# Patient Record
Sex: FEMALE | Race: WHITE | NOT HISPANIC OR LATINO | Employment: OTHER | URBAN - METROPOLITAN AREA
[De-identification: names, ages, dates, MRNs, and addresses within clinical notes are randomized per-mention and may not be internally consistent; named-entity substitution may affect disease eponyms.]

---

## 2023-01-16 ENCOUNTER — OFFICE VISIT (OUTPATIENT)
Dept: GYNECOLOGIC ONCOLOGY | Facility: CLINIC | Age: 68
End: 2023-01-16

## 2023-01-16 VITALS
SYSTOLIC BLOOD PRESSURE: 120 MMHG | HEIGHT: 63 IN | WEIGHT: 139 LBS | BODY MASS INDEX: 24.63 KG/M2 | DIASTOLIC BLOOD PRESSURE: 64 MMHG | TEMPERATURE: 96.5 F

## 2023-01-16 DIAGNOSIS — C54.1 ENDOMETRIAL STROMAL SARCOMA (HCC): Primary | ICD-10-CM

## 2023-01-16 NOTE — ASSESSMENT & PLAN NOTE
Patient is a very pleasant 60-year-old female with a history of endometrial stromal sarcoma status post hysterectomy BSO staging followed by 6 cycles of Taxotere and gemcitabine  Patient has no evidence of recurrence of disease  Patient does have some dyspareunia  Exam is unremarkable without shortening of the vagina  This is likely related to surgery and lack of estrogen  We have recommended positional changes as well as lubrication  Patient will follow-up in 3 months for repeat exam and CAT scan

## 2023-01-16 NOTE — PROGRESS NOTES
Assessment/Plan:    Problem List Items Addressed This Visit        Genitourinary    Endometrial stromal sarcoma [facility]) - Primary     Patient is a very pleasant 69-year-old female with a history of endometrial stromal sarcoma status post hysterectomy BSO staging followed by 6 cycles of Taxotere and gemcitabine  Patient has no evidence of recurrence of disease  Patient does have some dyspareunia  Exam is unremarkable without shortening of the vagina  This is likely related to surgery and lack of estrogen  We have recommended positional changes as well as lubrication  Patient will follow-up in 3 months for repeat exam and CAT scan  Relevant Orders    CT chest abdomen pelvis w contrast    BUN    Creatinine, serum         CHIEF COMPLAINT: Surveillance endometrial stromal sarcoma      Problem:  Cancer Staging   Endometrial stromal sarcoma [facility])  Staging form: Corpus Uteri - Sarcoma, AJCC 8th Edition  - Clinical stage from 4/16/2021: FIGO Stage IB (cT1b, cN0, cM0) - Signed by Elian Chambers MD on 4/16/2021  - Pathologic: FIGO Stage IB (pT1b, pN0, cM0) - Signed by Elian Chambers MD on 4/16/2021        Previous therapy:  Oncology History   Personal history of adenocarcinoma of breast    Initial Diagnosis    Malignant neoplasm of right breast, stage 1, estrogen receptor positive ([facility] [address] )     11/7/2014 Surgery    Right mastectomy, SLNB  Left mastectomy, SLNB  Bilateral breast reconstruction with expanders  Dr Carias Client      12/2014 -  Hormone Therapy    Anastrazole     3/13/2015 Surgery    Bilateral implants placed  Dr Carias Client       Genetic Testing    BRCA negative     Endometrial stromal sarcoma ([facility] [address] )   3/26/2021 Initial Diagnosis    Endometrial stromal sarcoma ([address] )     3/26/2021 Surgery    Robot Hyst BSO with >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  Stage 1B   Literature review to consider Chem RT BMJ 2019     4/16/2021 -  Cancer Staged    Staging form: Corpus Uteri - Sarcoma, AJCC 8th Edition  - Clinical stage from 4/16/2021: FIGO Stage IB (cT1b, cN0, cM0) - Signed by Milly Nur MD on 4/16/2021  Stage prefix: Initial diagnosis  Histologic grade (G): G3  Histologic grading system: 3 grade system       4/16/2021 -  Cancer Staged    Staging form: Corpus Uteri - Sarcoma, AJCC 8th Edition  - Pathologic: FIGO Stage IB (pT1b, pN0, cM0) - Signed by Milly Nur MD on 4/16/2021  Histologic grade (G): G3  Histologic grading system: 3 grade system  Residual tumor (R): R0 - None       5/10/2021 - 8/31/2021 Chemotherapy    pegfilgrastim (NEULASTA), 4 mg (100 % of original dose 4 mg), Subcutaneous, Once, 2 of 2 cycles  Dose modification: 4 mg (original dose 4 mg, Cycle 5), 4 mg (original dose 4 mg, Cycle 6)  Administration: 4 mg (8/10/2021), 4 mg (8/31/2021)  pegfilgrastim (NEULASTA ONPRO), 6 mg, Subcutaneous, Once, 4 of 4 cycles  Administration: 6 mg (5/17/2021), 6 mg (6/8/2021), 6 mg (6/28/2021), 6 mg (7/19/2021)  DOCEtaxel (TAXOTERE) chemo infusion, 100 mg/m2 = 164 mg (133 3 % of original dose 75 mg/m2), Intravenous, Once, 6 of 6 cycles  Dose modification: 100 mg/m2 (original dose 75 mg/m2, Cycle 1, Reason: Other (Must fill in a comment))  Administration: 164 mg (5/17/2021), 160 mg (6/8/2021), 160 mg (6/28/2021), 165 mg (7/19/2021), 165 mg (8/9/2021), 169 mg (8/30/2021)  gemcitabine (GEMZAR) infusion, 900 mg/m2 = 1,493 9 mg (112 5 % of original dose 800 mg/m2), Intravenous, Once, 6 of 6 cycles  Dose modification: 900 mg/m2 (original dose 800 mg/m2, Cycle 1, Reason: Other (Must fill in a comment))  Administration: 1,493 9 mg (5/10/2021), 1,476 mg (5/17/2021), 1,400 mg (6/2/2021), 1,476 mg (6/8/2021), 1,400 mg (6/21/2021), 1,400 mg (6/28/2021), 1,400 mg (7/12/2021), 1,485 2 mg (7/19/2021), 1,485 2 mg (8/3/2021), 1,485 2 mg (8/9/2021), 1,600 mg (8/23/2021), 1,520 9 mg (8/30/2021)           Patient ID: Gonsalo Ulloa is a 75 y o  female  Patient is a very pleasant 79-year-old female with a history of stage Ib endometrial stromal sarcoma  She underwent surgery followed by 6 cycles of docetaxel gemcitabine  She has been disease-free since initial diagnosis in 2021  Patient now presents for follow-up  She is without complaint  Follow-up CAT scan is unremarkable  IMPRESSION:     No definite evidence of metastatic disease in the chest, abdomen or pelvis  Trace amount of fluid in the pelvis/presacral region of unknown clinical significance, possibly physiologic  Recommend attention to this region on subsequent follow-up        Today, the patient is doing well  She denies significant abdominal pain, pelvic pain, nausea, vomiting, constipation, diarrhea, fevers, chills, or vaginal bleeding  Patient does note some discomfort with intercourse  The following portions of the patient's history were reviewed and updated as appropriate: current medications, past family history, past medical history, past social history, past surgical history and problem list     Review of Systems   Constitutional: Negative  HENT: Negative  Eyes: Negative  Respiratory: Negative  Cardiovascular: Negative  Gastrointestinal: Negative  Endocrine: Negative  Genitourinary: Negative  Musculoskeletal: Negative  Skin: Negative  Neurological: Negative  Hematological: Negative  Psychiatric/Behavioral: Negative  Current Outpatient Medications   Medication Sig Dispense Refill   • Calcium Carb-Cholecalciferol (OSCAL-D) 500 mg-200 units per tablet Take 1 tablet by mouth 2 (two) times a day with meals     • Cholecalciferol (Vitamin D3) 1 25 MG (48850 UT) TABS Take by mouth     • glucosamine-chondroitin 500-400 MG tablet Take 1 tablet by mouth in the morning       No current facility-administered medications for this visit             Objective:    Blood pressure 120/64, temperature (!) 96 5 °F (35 8 °C), temperature source Tympanic, height 5' 3" (1 6 m), weight 63 kg (139 lb), not currently breastfeeding  Body mass index is 24 62 kg/m²  Body surface area is 1 66 meters squared  Physical Exam  Constitutional:       Appearance: She is well-developed  HENT:      Head: Normocephalic and atraumatic  Neck:      Thyroid: No thyromegaly  Cardiovascular:      Rate and Rhythm: Normal rate and regular rhythm  Heart sounds: Normal heart sounds  Pulmonary:      Effort: Pulmonary effort is normal       Breath sounds: Normal breath sounds  Abdominal:      General: Bowel sounds are normal       Palpations: Abdomen is soft  Comments: Well healed laparoscopic incisions  Genitourinary:     Comments: -Normal external female genitalia, normal Bartholin's and [place]'s glands                  -Normal midline urethral meatus  No lesions notes                  -Bladder without fullness mass or tenderness                  -Vagina without lesion or discharge No significant cystocele or rectocele noted                  -Cervix surgically absent                  -Uterus surgically absent                  -Adnexae surgically absent                  - Anus without fissure of lesion    Musculoskeletal:         General: Normal range of motion  Cervical back: Normal range of motion and neck supple  Lymphadenopathy:      Cervical: No cervical adenopathy  Skin:     General: Skin is warm and dry  Neurological:      Mental Status: She is alert and oriented to person, place, and time     Psychiatric:         Behavior: Behavior normal          Lab Results   Component Value Date     3 9 03/08/2022     Lab Results   Component Value Date     11/01/2015    K 4 2 10/07/2022     10/07/2022    CO2 27 10/07/2022    ANIONGAP 5 11/01/2015    BUN 16 01/06/2023    CREATININE 0 75 01/06/2023    GLUCOSE 98 11/01/2015    GLUF 115 (H) 10/07/2022    CALCIUM 9 4 10/07/2022    CORRECTEDCA 9 2 09/16/2021    AST 12 10/07/2022    ALT 25 10/07/2022    ALKPHOS 92 10/07/2022    PROT 7 5 11/01/2015 BILITOT 1 15 (H) 11/01/2015    EGFR 82 01/06/2023     Lab Results   Component Value Date    WBC 7 29 09/16/2021    HGB 9 1 (L) 09/16/2021    HCT 30 1 (L) 09/16/2021     (H) 09/16/2021     (H) 09/16/2021     Lab Results   Component Value Date    NEUTROABS 4 81 09/16/2021        Trend:  Lab Results   Component Value Date     3 9 03/08/2022     3 6 12/14/2021     5 7 10/25/2021     26 8 09/09/2021     12 0 08/19/2021     6 8 08/02/2021     5 4 07/08/2021     6 9 06/17/2021     9 4 05/27/2021     12 6 05/05/2021

## 2023-04-20 ENCOUNTER — APPOINTMENT (OUTPATIENT)
Dept: LAB | Facility: CLINIC | Age: 68
End: 2023-04-20

## 2023-04-20 DIAGNOSIS — E21.3 HYPERPARATHYROIDISM (HCC): ICD-10-CM

## 2023-04-20 DIAGNOSIS — C54.1 ENDOMETRIAL STROMAL SARCOMA (HCC): ICD-10-CM

## 2023-04-20 DIAGNOSIS — M81.0 AGE-RELATED OSTEOPOROSIS WITHOUT CURRENT PATHOLOGICAL FRACTURE: ICD-10-CM

## 2023-04-20 DIAGNOSIS — E55.9 VITAMIN D DEFICIENCY: ICD-10-CM

## 2023-04-20 LAB
25(OH)D3 SERPL-MCNC: 21.1 NG/ML (ref 30–100)
ALBUMIN SERPL BCP-MCNC: 4 G/DL (ref 3.5–5)
ALP SERPL-CCNC: 87 U/L (ref 46–116)
ALT SERPL W P-5'-P-CCNC: 30 U/L (ref 12–78)
ANION GAP SERPL CALCULATED.3IONS-SCNC: 4 MMOL/L (ref 4–13)
AST SERPL W P-5'-P-CCNC: 17 U/L (ref 5–45)
BILIRUB SERPL-MCNC: 1.37 MG/DL (ref 0.2–1)
BUN SERPL-MCNC: 19 MG/DL (ref 5–25)
CALCIUM SERPL-MCNC: 10.4 MG/DL (ref 8.3–10.1)
CHLORIDE SERPL-SCNC: 107 MMOL/L (ref 96–108)
CO2 SERPL-SCNC: 26 MMOL/L (ref 21–32)
CREAT SERPL-MCNC: 0.78 MG/DL (ref 0.6–1.3)
GFR SERPL CREATININE-BSD FRML MDRD: 78 ML/MIN/1.73SQ M
GLUCOSE P FAST SERPL-MCNC: 122 MG/DL (ref 65–99)
PHOSPHATE SERPL-MCNC: 3.9 MG/DL (ref 2.3–4.1)
POTASSIUM SERPL-SCNC: 5.7 MMOL/L (ref 3.5–5.3)
PROT SERPL-MCNC: 7.3 G/DL (ref 6.4–8.4)
PTH-INTACT SERPL-MCNC: 73.1 PG/ML (ref 18.4–80.1)
SODIUM SERPL-SCNC: 137 MMOL/L (ref 135–147)

## 2023-04-24 ENCOUNTER — OFFICE VISIT (OUTPATIENT)
Dept: GYNECOLOGIC ONCOLOGY | Facility: CLINIC | Age: 68
End: 2023-04-24

## 2023-04-24 VITALS
HEART RATE: 68 BPM | WEIGHT: 134 LBS | DIASTOLIC BLOOD PRESSURE: 64 MMHG | SYSTOLIC BLOOD PRESSURE: 118 MMHG | OXYGEN SATURATION: 99 % | HEIGHT: 63 IN | BODY MASS INDEX: 23.74 KG/M2

## 2023-04-24 DIAGNOSIS — C54.1 ENDOMETRIAL STROMAL SARCOMA (HCC): ICD-10-CM

## 2023-04-24 DIAGNOSIS — C54.1 ENDOMETRIAL SARCOMA (HCC): Primary | ICD-10-CM

## 2023-04-24 NOTE — PROGRESS NOTES
Assessment/Plan:    Problem List Items Addressed This Visit        Genitourinary    Endometrial stromal sarcoma [facility])     Patient is a very pleasant 50-year-old female with a history of stage Ib endometrial stromal sarcoma now without evidence of recurrence of disease approximately 2 years out since treatment with surgery and chemotherapy  We will extend her surveillance visits every 6 months and a CAT scan will be performed at that time  Other Visit Diagnoses     Endometrial sarcoma ([facility] [address] )    -  Primary    Relevant Orders    CT chest abdomen pelvis w contrast    BUN    Creatinine, serum            CHIEF COMPLAINT: Surveillance endometrial stromal sarcoma stage Ib      Problem:  Cancer Staging   Endometrial stromal sarcoma [facility])  Staging form: Corpus Uteri - Sarcoma, AJCC 8th Edition  - Clinical stage from 4/16/2021: FIGO Stage IB (cT1b, cN0, cM0) - Signed by Delia Fu MD on 4/16/2021  - Pathologic: FIGO Stage IB (pT1b, pN0, cM0) - Signed by Delia Fu MD on 4/16/2021        Previous therapy:  Oncology History   Personal history of adenocarcinoma of breast    Initial Diagnosis    Malignant neoplasm of right breast, stage 1, estrogen receptor positive ([facility] [address] )     11/7/2014 Surgery    Right mastectomy, SLNB  Left mastectomy, SLNB  Bilateral breast reconstruction with expanders  Dr Irving Desai      12/2014 -  Hormone Therapy    Anastrazole     3/13/2015 Surgery    Bilateral implants placed  Dr Irving Desai       Genetic Testing    BRCA negative     Endometrial stromal sarcoma ([facility] [address] )   3/26/2021 Initial Diagnosis    Endometrial stromal sarcoma ([facility] [address] )     3/26/2021 Surgery    Robot Hyst BSO with >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  Stage 1B   Literature review to consider Chem RT BMJ 2019     4/16/2021 -  Cancer Staged    Staging form: Corpus Uteri - Sarcoma, AJCC 8th Edition  - Clinical stage from 4/16/2021: FIGO Stage IB (cT1b, cN0, cM0) - Signed by Delia Fu MD on 4/16/2021  Stage prefix: Initial diagnosis  Histologic grade (G): G3  Histologic grading system: 3 grade system       4/16/2021 -  Cancer Staged    Staging form: Corpus Uteri - Sarcoma, AJCC 8th Edition  - Pathologic: FIGO Stage IB (pT1b, pN0, cM0) - Signed by Harrison Gresham MD on 4/16/2021  Histologic grade (G): G3  Histologic grading system: 3 grade system  Residual tumor (R): R0 - None       5/10/2021 - 8/31/2021 Chemotherapy    pegfilgrastim (NEULASTA), 4 mg (100 % of original dose 4 mg), Subcutaneous, Once, 2 of 2 cycles  Dose modification: 4 mg (original dose 4 mg, Cycle 5), 4 mg (original dose 4 mg, Cycle 6)  Administration: 4 mg (8/10/2021), 4 mg (8/31/2021)  pegfilgrastim (NEULASTA ONPRO), 6 mg, Subcutaneous, Once, 4 of 4 cycles  Administration: 6 mg (5/17/2021), 6 mg (6/8/2021), 6 mg (6/28/2021), 6 mg (7/19/2021)  DOCEtaxel (TAXOTERE) chemo infusion, 100 mg/m2 = 164 mg (133 3 % of original dose 75 mg/m2), Intravenous, Once, 6 of 6 cycles  Dose modification: 100 mg/m2 (original dose 75 mg/m2, Cycle 1, Reason: Other (Must fill in a comment))  Administration: 164 mg (5/17/2021), 160 mg (6/8/2021), 160 mg (6/28/2021), 165 mg (7/19/2021), 165 mg (8/9/2021), 169 mg (8/30/2021)  gemcitabine (GEMZAR) infusion, 900 mg/m2 = 1,493 9 mg (112 5 % of original dose 800 mg/m2), Intravenous, Once, 6 of 6 cycles  Dose modification: 900 mg/m2 (original dose 800 mg/m2, Cycle 1, Reason: Other (Must fill in a comment))  Administration: 1,493 9 mg (5/10/2021), 1,476 mg (5/17/2021), 1,400 mg (6/2/2021), 1,476 mg (6/8/2021), 1,400 mg (6/21/2021), 1,400 mg (6/28/2021), 1,400 mg (7/12/2021), 1,485 2 mg (7/19/2021), 1,485 2 mg (8/3/2021), 1,485 2 mg (8/9/2021), 1,600 mg (8/23/2021), 1,520 9 mg (8/30/2021)           Patient ID: Ev Cannon is a 75 y o  female  Patient is a very pleasant 69-year-old female with a 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    She underwent hysterectomy staging followed by 6 cycles "of docetaxel gemcitabine  She has been without disease since that time  Is approximately 2 years since her original diagnosis  Since her last visit the patient has recently undergone a CT scan of the chest abdomen pelvis  It reveals the following:  IMPRESSION:     No evidence of recurrent or metastatic disease throughout the chest, abdomen or pelvis  Today, the patient is doing well  She denies significant abdominal pain, pelvic pain, nausea, vomiting, constipation, diarrhea, fevers, chills, or vaginal bleeding  The following portions of the patient's history were reviewed and updated as appropriate: allergies, current medications, past family history, past medical history, past surgical history and problem list     Review of Systems   Constitutional: Negative  HENT: Negative  Eyes: Negative  Respiratory: Negative  Cardiovascular: Negative  Gastrointestinal: Negative  Endocrine: Negative  Genitourinary: Negative  Musculoskeletal: Negative  Skin: Negative  Neurological: Negative  Hematological: Negative  Psychiatric/Behavioral: Negative  Current Outpatient Medications   Medication Sig Dispense Refill   • Calcium Carb-Cholecalciferol (OSCAL-D) 500 mg-200 units per tablet Take 1 tablet by mouth 2 (two) times a day with meals     • Cholecalciferol (Vitamin D3) 1 25 MG (35724 UT) TABS Take by mouth     • glucosamine-chondroitin 500-400 MG tablet Take 1 tablet by mouth in the morning       No current facility-administered medications for this visit  Objective:    Blood pressure 118/64, pulse 68, height 5' 3\" (1 6 m), weight 60 8 kg (134 lb), SpO2 99 %, not currently breastfeeding  Body mass index is 23 74 kg/m²  Body surface area is 1 63 meters squared  Physical Exam  Constitutional:       Appearance: She is well-developed  HENT:      Head: Normocephalic and atraumatic  Neck:      Thyroid: No thyromegaly     Cardiovascular:      Rate and Rhythm: " Normal rate and regular rhythm  Heart sounds: Normal heart sounds  Pulmonary:      Effort: Pulmonary effort is normal       Breath sounds: Normal breath sounds  Abdominal:      General: Bowel sounds are normal       Palpations: Abdomen is soft  Comments: Well healed laparoscopic incisions  Genitourinary:     Comments: -Normal external female genitalia, normal Bartholin's and [place]'s glands                  -Normal midline urethral meatus  No lesions notes                  -Bladder without fullness mass or tenderness                  -Vagina without lesion or discharge No significant cystocele or rectocele noted                  -Cervix surgically absent                  -Uterus surgically absent                  -Adnexae surgically absent                  - Anus without fissure of lesion    Musculoskeletal:         General: Normal range of motion  Cervical back: Normal range of motion and neck supple  Lymphadenopathy:      Cervical: No cervical adenopathy  Skin:     General: Skin is warm and dry  Neurological:      Mental Status: She is alert and oriented to person, place, and time     Psychiatric:         Behavior: Behavior normal          Lab Results   Component Value Date     3 9 03/08/2022     Lab Results   Component Value Date     11/01/2015    K 5 7 (H) 04/20/2023     04/20/2023    CO2 26 04/20/2023    ANIONGAP 5 11/01/2015    BUN 19 04/20/2023    CREATININE 0 78 04/20/2023    GLUCOSE 98 11/01/2015    GLUF 122 (H) 04/20/2023    CALCIUM 10 4 (H) 04/20/2023    CORRECTEDCA 9 2 09/16/2021    AST 17 04/20/2023    ALT 30 04/20/2023    ALKPHOS 87 04/20/2023    PROT 7 5 11/01/2015    BILITOT 1 15 (H) 11/01/2015    EGFR 78 04/20/2023     Lab Results   Component Value Date    WBC 7 29 09/16/2021    HGB 9 1 (L) 09/16/2021    HCT 30 1 (L) 09/16/2021     (H) 09/16/2021     (H) 09/16/2021     Lab Results   Component Value Date    NEUTROABS 4 81 09/16/2021        Trend:  Lab Results   Component Value Date     3 9 03/08/2022     3 6 12/14/2021     5 7 10/25/2021     26 8 09/09/2021     12 0 08/19/2021     6 8 08/02/2021     5 4 07/08/2021     6 9 06/17/2021     9 4 05/27/2021     12 6 05/05/2021

## 2023-04-24 NOTE — ASSESSMENT & PLAN NOTE
Patient is a very pleasant 79-year-old female with a history of stage Ib endometrial stromal sarcoma now without evidence of recurrence of disease approximately 2 years out since treatment with surgery and chemotherapy  We will extend her surveillance visits every 6 months and a CAT scan will be performed at that time

## 2023-05-17 ENCOUNTER — OFFICE VISIT (OUTPATIENT)
Dept: SURGICAL ONCOLOGY | Facility: CLINIC | Age: 68
End: 2023-05-17

## 2023-05-17 VITALS
HEART RATE: 85 BPM | OXYGEN SATURATION: 98 % | SYSTOLIC BLOOD PRESSURE: 120 MMHG | WEIGHT: 134 LBS | DIASTOLIC BLOOD PRESSURE: 70 MMHG | HEIGHT: 63 IN | TEMPERATURE: 97.5 F | RESPIRATION RATE: 16 BRPM | BODY MASS INDEX: 23.74 KG/M2

## 2023-05-17 DIAGNOSIS — Z08 ENCOUNTER FOR FOLLOW-UP SURVEILLANCE OF BREAST CANCER: Primary | ICD-10-CM

## 2023-05-17 DIAGNOSIS — Z85.3 PERSONAL HISTORY OF ADENOCARCINOMA OF BREAST: ICD-10-CM

## 2023-05-17 DIAGNOSIS — Z85.3 ENCOUNTER FOR FOLLOW-UP SURVEILLANCE OF BREAST CANCER: Primary | ICD-10-CM

## 2023-05-17 DIAGNOSIS — N60.92 ATYPICAL DUCTAL HYPERPLASIA OF LEFT BREAST: ICD-10-CM

## 2023-05-17 NOTE — PROGRESS NOTES
Surgical Oncology Follow Up       [facility] SURGICAL ONCOLOGY [place]IE  [facility] [address]    Evangelist Frias  1955  [id number]  [facility] SURGICAL ONCOLOGY [facility] [address]    Chief Complaint   Patient presents with   • Follow-up       Assessment/Plan   Diagnoses and all orders for this visit:    Encounter for follow-up surveillance of breast cancer    Personal history of adenocarcinoma of breast    Atypical ductal hyperplasia of left breast        Advance Care Planning/Advance Directives:  Discussed disease status, cancer treatment plans and/or cancer treatment goals with the patient  Oncology History:    Oncology History   Personal history of adenocarcinoma of breast    Initial Diagnosis    Malignant neoplasm of right breast, stage 1, estrogen receptor positive ([facility] [address] )     11/7/2014 Surgery    Right mastectomy, SLNB  Left mastectomy, SLNB  Bilateral breast reconstruction with expanders  Dr Tricia Jones      12/2014 -  Hormone Therapy    Anastrazole     3/13/2015 Surgery    Bilateral implants placed  Dr Tricia Jones       Genetic Testing    BRCA negative     Endometrial stromal sarcoma ([facility] [address] )   3/26/2021 Initial Diagnosis    Endometrial stromal sarcoma ([facility] [address] )     3/26/2021 Surgery    Robot Hyst BSO with >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  Stage 1B   Literature review to consider Chem RT BMJ 2019     4/16/2021 -  Cancer Staged    Staging form: Corpus Uteri - Sarcoma, AJCC 8th Edition  - Clinical stage from 4/16/2021: FIGO Stage IB (cT1b, cN0, cM0) - Signed by Asad Berkowitz MD on 4/16/2021  Stage prefix: Initial diagnosis  Histologic grade (G): G3  Histologic grading system: 3 grade system       4/16/2021 -  Cancer Staged    Staging form: Corpus Uteri - Sarcoma, AJCC 8th Edition  - Pathologic: FIGO Stage IB (pT1b, pN0, cM0) - Signed by Asad Berkowitz MD on 4/16/2021  Histologic grade (G): G3  Histologic grading system: 3 grade system  Residual tumor (R): R0 - None       5/10/2021 - 8/31/2021 Chemotherapy    pegfilgrastim (NEULASTA), 4 mg (100 % of original dose 4 mg), Subcutaneous, Once, 2 of 2 cycles  Dose modification: 4 mg (original dose 4 mg, Cycle 5), 4 mg (original dose 4 mg, Cycle 6)  Administration: 4 mg (8/10/2021), 4 mg (8/31/2021)  pegfilgrastim (NEULASTA ONPRO), 6 mg, Subcutaneous, Once, 4 of 4 cycles  Administration: 6 mg (5/17/2021), 6 mg (6/8/2021), 6 mg (6/28/2021), 6 mg (7/19/2021)  DOCEtaxel (TAXOTERE) chemo infusion, 100 mg/m2 = 164 mg (133 3 % of original dose 75 mg/m2), Intravenous, Once, 6 of 6 cycles  Dose modification: 100 mg/m2 (original dose 75 mg/m2, Cycle 1, Reason: Other (Must fill in a comment))  Administration: 164 mg (5/17/2021), 160 mg (6/8/2021), 160 mg (6/28/2021), 165 mg (7/19/2021), 165 mg (8/9/2021), 169 mg (8/30/2021)  gemcitabine (GEMZAR) infusion, 900 mg/m2 = 1,493 9 mg (112 5 % of original dose 800 mg/m2), Intravenous, Once, 6 of 6 cycles  Dose modification: 900 mg/m2 (original dose 800 mg/m2, Cycle 1, Reason: Other (Must fill in a comment))  Administration: 1,493 9 mg (5/10/2021), 1,476 mg (5/17/2021), 1,400 mg (6/2/2021), 1,476 mg (6/8/2021), 1,400 mg (6/21/2021), 1,400 mg (6/28/2021), 1,400 mg (7/12/2021), 1,485 2 mg (7/19/2021), 1,485 2 mg (8/3/2021), 1,485 2 mg (8/9/2021), 1,600 mg (8/23/2021), 1,520 9 mg (8/30/2021)         History of Present Illness: Breast cancer follow-up, no breast referable concerns, is doing well from the GYN standpoint  -Interval History: None    Review of Systems:  Review of Systems   Constitutional: Negative  Negative for appetite change and fever  Eyes: Negative  Respiratory: Negative for shortness of breath  Cardiovascular: Negative  Gastrointestinal: Negative  Endocrine: Negative  Genitourinary: Negative  Musculoskeletal: Negative  Negative for arthralgias and myalgias  Skin: Negative  Allergic/Immunologic: Negative  Neurological: Negative  Hematological: Negative  Negative for adenopathy  Does not bruise/bleed easily  Psychiatric/Behavioral: Negative          Patient Active Problem List   Diagnosis   • Personal history of adenocarcinoma of breast   • Encounter for follow-up surveillance of breast cancer   • Uterine mass   • Drug-induced rash with eosinophilia and systemic symptoms   • History of mastectomy, right   • History of left mastectomy   • PONV (postoperative nausea and vomiting)   • History of robot-assisted laparoscopic hysterectomy   • Other eosinophilia   • Endometrial stromal sarcoma (HCC)   • Encounter for central line care   • Chemotherapy induced neutropenia (HCC)   • Age-related osteoporosis without current pathological fracture   • Vitamin D deficiency   • Hyperparathyroidism ([facility] [address] )     Past Medical History:   Diagnosis Date   • Anxiety    • Arthritis    • Colon polyp    • Costochondritis     left side/relieved withmuscle relaxer   • Depression    • Elevated WBC count     saw Dr Lizeth Davidson 3/18/21   • Exercise involving walking     and cycling in good weather   • Hypertension    • Limb alert care status     No BP/IV Right Arm   • Motion sickness    • Patient is Hinduism    • Pelvic mass    • PONV (postoperative nausea and vomiting)    • Post-menopausal    • Postmenopausal bleeding    • Prediabetes    • Refusal of blood transfusions as patient is Hinduism    • Use of aromatase inhibitors     finished arimidex   • Wears glasses      Past Surgical History:   Procedure Laterality Date   • BONE MARROW BIOPSY  03/23/2021    by Dr Lizeth Davidson   • BREAST BIOPSY Right 10/13/2014    NEEDLE CORE STEREOTACTIC X2    • BREAST RECONSTRUCTION  03/13/2015    WITH IMPLANT PROSTHESIS   • BREAST RECONSTRUCTION Bilateral 11/07/2014    WITH TISSUE EXPANDER    • BREAST SURGERY Bilateral    • COLONOSCOPY     • DILATION AND CURETTAGE OF UTERUS     • IR PORT PLACEMENT 5/6/2021   • JOINT REPLACEMENT Bilateral     knee   • KNEE ARTHROSCOPY Bilateral    • LIPOSUCTION W/ FAT INJECTION N/A 3/17/2017    Procedure: FAT GRAFTING ;  Surgeon: Isael Gongora MD;  Location: [facility];  Service:    • MASTECTOMY  11/07/2014 11/7/14 RIGHT-LEFT PROPHYLACTIC MASTECTOMY   • OOPHORECTOMY Right 04/1996    OVARY REMOVED BECAUSE OF TWISTED FALLOPIAN TUBE    • CO LAPS TOTAL HYSTERECT 250 GM/< W/RMVL TUBE/OVARY N/A 3/26/2021    Procedure: ROBOTIC TOTAL LAPAROSCOPIC HYSTERECTOMY, BILATERAL SALPINGO-OOPHORECTOMY;  Surgeon: Brando Escalante MD;  Location: [facility];  Service: Gynecology Oncology   • CO REVISION OF RECONSTRUCTED BREAST Bilateral 3/17/2017    Procedure: BREAST MOUND REVISION ;  Surgeon: Isael Gongora MD;  [facility]: [facility];  Service: Plastics   • CO REVISION OF RECONSTRUCTED BREAST Bilateral 3/11/2016    Procedure: RECONSTRUCTION BREAST MOUND WITH FAT GRAFTING BILATERAL BREASTS ;  Surgeon: Isael Gongora MD;  [facility];  Service: Plastics   • CO RMVL TYLER CTR VAD W/SUBQ PORT/ CTR/PRPH INSJ N/A 4/5/2022    Procedure: REMOVAL VENOUS PORT (PORT-A-CATH)IR;  Surgeon: Lee Chu DO;  [facility];  Service: Interventional Radiology   • SENTINEL LYMPH NODE BIOPSY Bilateral 11/07/2014   • TUBAL LIGATION       Family History   Problem Relation Age of Onset   • Breast cancer Mother         AGE DX UNK   • Prostate cancer Father         AGE DX UNK   • Breast cancer Maternal Aunt         AGE DX UNK   • Breast cancer Paternal Grandmother         AGE DX UNK   • Diabetes unspecified Maternal Aunt      Social History     Socioeconomic History   • Marital status: /Civil Union     Spouse name: Not on file   • Number of children: Not on file   • Years of education: Not on file   • Highest education level: Not on file   Occupational History   • Not on file   Tobacco Use   • Smoking status: Never   • Smokeless tobacco: Never   Vaping Use   • Vaping Use: Never used   Substance and Sexual Activity   • Alcohol use: Yes     Alcohol/week: 8 0 standard drinks     Types: 4 Glasses of wine, 4 Standard drinks or equivalent per week     Comment: on the weekends-Wine/Liquor   • Drug use: Never   • Sexual activity: Not Currently     Partners: Male     Birth control/protection: Diaphragm, Female Sterilization, Other     Comment: Pill   Other Topics Concern   • Not on file   Social History Narrative   • Not on file     Social Determinants of Health     Financial Resource Strain: Not on file   Food Insecurity: Not on file   Transportation Needs: Not on file   Physical Activity: Not on file   Stress: Not on file   Social Connections: Not on file   Intimate Partner Violence: Not on file   Housing Stability: Not on file       Current Outpatient Medications:   •  Calcium Carb-Cholecalciferol (OSCAL-D) 500 mg-200 units per tablet, Take 1 tablet by mouth 2 (two) times a day with meals, Disp: , Rfl:   •  Cholecalciferol (Vitamin D3) 1 25 MG (20531 UT) TABS, Take by mouth, Disp: , Rfl:   •  glucosamine-chondroitin 500-400 MG tablet, Take 1 tablet by mouth in the morning, Disp: , Rfl:   Allergies   Allergen Reactions   • Zithromax [Azithromycin] GI Intolerance       The following portions of the patient's history were reviewed and updated as appropriate: allergies, current medications, past family history, past medical history, past social history, past surgical history and problem list         Vitals:    05/17/23 0931   BP: 120/70   Pulse: 85   Resp: 16   Temp: 97 5 °F (36 4 °C)   SpO2: 98%       Physical Exam  Constitutional:       General: She is not in acute distress  Appearance: Normal appearance  She is well-developed  HENT:      Head: Normocephalic and atraumatic  Cardiovascular:      Heart sounds: Normal heart sounds  Pulmonary:      Breath sounds: Normal breath sounds  Chest:   Breasts:     Right: Skin change ( Mastectomy scar with implant) present  No mass or tenderness        Left: Skin change ( Mastectomy scar with implant) present  No mass or tenderness  Abdominal:      Palpations: Abdomen is soft  Lymphadenopathy:      Upper Body:      Right upper body: No supraclavicular, axillary or pectoral adenopathy  Left upper body: No supraclavicular, axillary or pectoral adenopathy  Neurological:      Mental Status: She is alert and oriented to person, place, and time  Psychiatric:         Mood and Affect: Mood normal              Discussion/Summary: 74-year-old female status post right mastectomy for a stage Ia invasive duct carcinoma  She had a left mastectomy as well showing atypical duct hyperplasia  She completed her anastrozole  She more recently was diagnosed with a high-grade endometrial stromal sarcoma  Her prior genetic testing was negative  There is no evidence of disease based on exam today  We will see her again in 1 year in our survivorship clinic or sooner should the need arise

## 2023-10-13 ENCOUNTER — APPOINTMENT (OUTPATIENT)
Dept: LAB | Facility: CLINIC | Age: 68
End: 2023-10-13
Payer: COMMERCIAL

## 2023-10-13 ENCOUNTER — TRANSCRIBE ORDERS (OUTPATIENT)
Dept: LAB | Facility: CLINIC | Age: 68
End: 2023-10-13

## 2023-10-13 DIAGNOSIS — M81.0 AGE-RELATED OSTEOPOROSIS WITHOUT CURRENT PATHOLOGICAL FRACTURE: ICD-10-CM

## 2023-10-13 DIAGNOSIS — R79.9 NONSPECIFIC FINDINGS ON EXAMINATION OF BLOOD: ICD-10-CM

## 2023-10-13 DIAGNOSIS — C54.1 ENDOMETRIAL SARCOMA (HCC): ICD-10-CM

## 2023-10-13 DIAGNOSIS — R79.9 NONSPECIFIC FINDINGS ON EXAMINATION OF BLOOD: Primary | ICD-10-CM

## 2023-10-13 LAB
ALBUMIN SERPL BCP-MCNC: 4.2 G/DL (ref 3.5–5)
ALP SERPL-CCNC: 66 U/L (ref 34–104)
ALT SERPL W P-5'-P-CCNC: 19 U/L (ref 7–52)
ANION GAP SERPL CALCULATED.3IONS-SCNC: 10 MMOL/L
AST SERPL W P-5'-P-CCNC: 25 U/L (ref 13–39)
BASOPHILS # BLD AUTO: 0.05 THOUSANDS/ÂΜL (ref 0–0.1)
BASOPHILS NFR BLD AUTO: 1 % (ref 0–1)
BILIRUB SERPL-MCNC: 1.3 MG/DL (ref 0.2–1)
BUN SERPL-MCNC: 15 MG/DL (ref 5–25)
CALCIUM SERPL-MCNC: 9.4 MG/DL (ref 8.4–10.2)
CHLORIDE SERPL-SCNC: 105 MMOL/L (ref 96–108)
CO2 SERPL-SCNC: 21 MMOL/L (ref 21–32)
CREAT SERPL-MCNC: 0.69 MG/DL (ref 0.6–1.3)
EOSINOPHIL # BLD AUTO: 0.14 THOUSAND/ÂΜL (ref 0–0.61)
EOSINOPHIL NFR BLD AUTO: 2 % (ref 0–6)
ERYTHROCYTE [DISTWIDTH] IN BLOOD BY AUTOMATED COUNT: 13 % (ref 11.6–15.1)
GFR SERPL CREATININE-BSD FRML MDRD: 89 ML/MIN/1.73SQ M
GLUCOSE P FAST SERPL-MCNC: 106 MG/DL (ref 65–99)
HCT VFR BLD AUTO: 48.9 % (ref 34.8–46.1)
HGB BLD-MCNC: 15.4 G/DL (ref 11.5–15.4)
IMM GRANULOCYTES # BLD AUTO: 0.01 THOUSAND/UL (ref 0–0.2)
IMM GRANULOCYTES NFR BLD AUTO: 0 % (ref 0–2)
LYMPHOCYTES # BLD AUTO: 1.92 THOUSANDS/ÂΜL (ref 0.6–4.47)
LYMPHOCYTES NFR BLD AUTO: 30 % (ref 14–44)
MCH RBC QN AUTO: 29.3 PG (ref 26.8–34.3)
MCHC RBC AUTO-ENTMCNC: 31.5 G/DL (ref 31.4–37.4)
MCV RBC AUTO: 93 FL (ref 82–98)
MONOCYTES # BLD AUTO: 0.55 THOUSAND/ÂΜL (ref 0.17–1.22)
MONOCYTES NFR BLD AUTO: 9 % (ref 4–12)
NEUTROPHILS # BLD AUTO: 3.65 THOUSANDS/ÂΜL (ref 1.85–7.62)
NEUTS SEG NFR BLD AUTO: 58 % (ref 43–75)
NRBC BLD AUTO-RTO: 0 /100 WBCS
PLATELET # BLD AUTO: 24 THOUSANDS/UL (ref 149–390)
PLATELET BLD QL SMEAR: ABNORMAL
PMV BLD AUTO: 13.2 FL (ref 8.9–12.7)
POIKILOCYTOSIS BLD QL SMEAR: PRESENT
POTASSIUM SERPL-SCNC: 5.2 MMOL/L (ref 3.5–5.3)
PROT SERPL-MCNC: 6.8 G/DL (ref 6.4–8.4)
RBC # BLD AUTO: 5.26 MILLION/UL (ref 3.81–5.12)
RBC MORPH BLD: PRESENT
SODIUM SERPL-SCNC: 136 MMOL/L (ref 135–147)
WBC # BLD AUTO: 6.32 THOUSAND/UL (ref 4.31–10.16)

## 2023-10-13 PROCEDURE — 80053 COMPREHEN METABOLIC PANEL: CPT

## 2023-10-13 PROCEDURE — 36415 COLL VENOUS BLD VENIPUNCTURE: CPT

## 2023-10-13 PROCEDURE — 85025 COMPLETE CBC W/AUTO DIFF WBC: CPT

## 2023-10-16 ENCOUNTER — TELEPHONE (OUTPATIENT)
Dept: SURGICAL ONCOLOGY | Facility: CLINIC | Age: 68
End: 2023-10-16

## 2023-10-16 ENCOUNTER — TELEPHONE (OUTPATIENT)
Dept: HEMATOLOGY ONCOLOGY | Facility: CLINIC | Age: 68
End: 2023-10-16

## 2023-10-16 DIAGNOSIS — C54.1 ENDOMETRIAL SARCOMA (HCC): Primary | ICD-10-CM

## 2023-10-16 NOTE — TELEPHONE ENCOUNTER
Appointment Schedule   Who are you speaking with? Patient   If it is not the patient, are they listed on an active communication consent form? N/A   Which provider is the appointment scheduled with? Dr. Lulu Tejada   At which location is the appointment scheduled for? [facility]   When is the appointment scheduled? Please list date and time 11/16/23 @ 840   What is the reason for this appointment? PCP wants her to come back due to her lab work    Did patient voice understanding of the details of this appointment? Yes   Was the no show policy reviewed with patient?  Yes

## 2023-10-16 NOTE — TELEPHONE ENCOUNTER
Patient Call    Who are you speaking with? Patient    If it is not the patient, are they listed on an active communication consent form? N/A   What is the reason for this call? Patient calling to confirm if she needs labs done for Dr. Kris Mike. While assisting her call was disconnected   Does this require a call back? yes   If a call back is required, please list best call back number [phone]   If a call back is required, advise that a message will be forwarded to their care team and someone will return their call as soon as possible. Did you relay this information to the patient?  yes

## 2023-10-17 ENCOUNTER — TELEPHONE (OUTPATIENT)
Dept: HEMATOLOGY ONCOLOGY | Facility: CLINIC | Age: 68
End: 2023-10-17

## 2023-10-17 DIAGNOSIS — D72.19 OTHER EOSINOPHILIA: Primary | ICD-10-CM

## 2023-10-17 NOTE — TELEPHONE ENCOUNTER
PCP requesting sooner appt to review recent lab work. Last seen by Dr. María Madsen on 4/14/21. I called patient, she denies any signs of bleeding or bruising. I have placed another order for CBC, she will have this done tomorrow. Appt scheduled for 10/19 with Dr. María Madsen in [facility] office. Patient is aware of appointment and appreciative.

## 2023-10-18 ENCOUNTER — TELEPHONE (OUTPATIENT)
Dept: HEMATOLOGY ONCOLOGY | Facility: CLINIC | Age: 68
End: 2023-10-18

## 2023-10-18 ENCOUNTER — APPOINTMENT (OUTPATIENT)
Dept: LAB | Facility: CLINIC | Age: 68
End: 2023-10-18
Payer: COMMERCIAL

## 2023-10-18 DIAGNOSIS — C54.1 ENDOMETRIAL SARCOMA (HCC): ICD-10-CM

## 2023-10-18 DIAGNOSIS — D72.19 OTHER EOSINOPHILIA: ICD-10-CM

## 2023-10-18 LAB
BASOPHILS # BLD AUTO: 0.06 THOUSANDS/ÂΜL (ref 0–0.1)
BASOPHILS NFR BLD AUTO: 1 % (ref 0–1)
CANCER AG125 SERPL-ACNC: 6.6 U/ML (ref 0–35)
EOSINOPHIL # BLD AUTO: 0.14 THOUSAND/ÂΜL (ref 0–0.61)
EOSINOPHIL NFR BLD AUTO: 2 % (ref 0–6)
ERYTHROCYTE [DISTWIDTH] IN BLOOD BY AUTOMATED COUNT: 12.8 % (ref 11.6–15.1)
HCT VFR BLD AUTO: 46.1 % (ref 34.8–46.1)
HGB BLD-MCNC: 15.5 G/DL (ref 11.5–15.4)
IMM GRANULOCYTES # BLD AUTO: 0.02 THOUSAND/UL (ref 0–0.2)
IMM GRANULOCYTES NFR BLD AUTO: 0 % (ref 0–2)
LYMPHOCYTES # BLD AUTO: 1.98 THOUSANDS/ÂΜL (ref 0.6–4.47)
LYMPHOCYTES NFR BLD AUTO: 25 % (ref 14–44)
MCH RBC QN AUTO: 30.8 PG (ref 26.8–34.3)
MCHC RBC AUTO-ENTMCNC: 33.6 G/DL (ref 31.4–37.4)
MCV RBC AUTO: 92 FL (ref 82–98)
MONOCYTES # BLD AUTO: 0.6 THOUSAND/ÂΜL (ref 0.17–1.22)
MONOCYTES NFR BLD AUTO: 8 % (ref 4–12)
NEUTROPHILS # BLD AUTO: 5.08 THOUSANDS/ÂΜL (ref 1.85–7.62)
NEUTS SEG NFR BLD AUTO: 64 % (ref 43–75)
NRBC BLD AUTO-RTO: 0 /100 WBCS
PLATELET # BLD AUTO: 200 THOUSANDS/UL (ref 149–390)
PMV BLD AUTO: 11.9 FL (ref 8.9–12.7)
RBC # BLD AUTO: 5.04 MILLION/UL (ref 3.81–5.12)
WBC # BLD AUTO: 7.88 THOUSAND/UL (ref 4.31–10.16)

## 2023-10-18 PROCEDURE — 85025 COMPLETE CBC W/AUTO DIFF WBC: CPT

## 2023-10-18 PROCEDURE — 36415 COLL VENOUS BLD VENIPUNCTURE: CPT

## 2023-10-18 PROCEDURE — 86304 IMMUNOASSAY TUMOR CA 125: CPT

## 2023-10-18 NOTE — TELEPHONE ENCOUNTER
Spoke to patient regarding her labs for Dr. Tip Varghese visit. Patient did go back for a re-draw and managed to complete labs for appointment.

## 2023-10-18 NOTE — TELEPHONE ENCOUNTER
Patient Call    Who are you speaking with? Patient    If it is not the patient, are they listed on an active communication consent form? Yes   What is the reason for this call? Patient went to lab to do as requested, could not draw enough blood, should she go back and try again for appmt tomorrow with Dr Jade Diaz? Does this require a call back? Yes   If a call back is required, please list [facility] call back number [phone]    If a call back is required, advise that a message will be forwarded to their care team and someone will return their call as soon as possible. Did you relay this information to the patient?  Yes

## 2023-10-19 ENCOUNTER — OFFICE VISIT (OUTPATIENT)
Dept: HEMATOLOGY ONCOLOGY | Facility: CLINIC | Age: 68
End: 2023-10-19
Payer: COMMERCIAL

## 2023-10-19 VITALS
WEIGHT: 151.8 LBS | HEIGHT: 63 IN | HEART RATE: 82 BPM | OXYGEN SATURATION: 98 % | BODY MASS INDEX: 26.9 KG/M2 | TEMPERATURE: 97.8 F | SYSTOLIC BLOOD PRESSURE: 118 MMHG | RESPIRATION RATE: 17 BRPM | DIASTOLIC BLOOD PRESSURE: 70 MMHG

## 2023-10-19 DIAGNOSIS — D58.2 ELEVATED HEMOGLOBIN (HCC): Primary | ICD-10-CM

## 2023-10-19 PROCEDURE — 99214 OFFICE O/P EST MOD 30 MIN: CPT | Performed by: INTERNAL MEDICINE

## 2023-10-19 NOTE — PROGRESS NOTES
Hematology Outpatient Follow - Up Note  Chanel Ferrara 70 y.o. female MRN: @ Encounter: [id number]        Date:  10/19/2023        Assessment/ Plan:    #1.  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 Previous history of eosinophilia bone marrow biopsy was negative  3. Previous history of breast cancer stage I status post right mastectomy and left prophylactic mastectomy treated with anastrozole for 5 years  4. Erroneous thrombocytopenia by lab now with normal lab showing normal platelets  5. Elevated hemoglobin we will check JAK2 mutation        Labs and imaging studies are reviewed by ordering provider once results are available. If there are findings that need immediate attention, you will be contacted when results available. Discussing results and the implication on your healthcare is best discussed in person at your follow-up visit. HPI:    79-year-old postmenopausal female who had abnormal mammogram of the right breast subsequently diagnosed with multifocal ductal carcinoma in situ, the patient underwent right mastectomy and left prophylactic mastectomy the left side was negative for malignancy, the right side showed invasive ductal carcinoma with tubular features and 2 foci of malignancy the 1st focus measuring 9 mm and the 2nd 1 measuring 1.7 mm grade 1, no evidence of lymphovascular invasion with background of carcinoma in situ of multifocal, extensive however negative margins, ERPR positive, HER2 negative stage I ( pT1b, pN0, grade 1) status post immediate expanders and later on implantation  She had positive family history of breast cancer in her cousin  She was initiated on anastrozole 1 mg p.o.  Daily since December 2014   DEXA scan showed osteopenia initially and later on osteoporosis, she declined treatment with Prolia     Uterus biopsy showed hyperplasia, anastrozole was stopped in January 2020     Intermittent abdominal pain especially in the left lower quadrant, CT scan showed enlarging uterus mass with iliac lymph node enlargement, followed by Gynecology, she received steroid shot in the left lower quadrant and later on she received Medrol Dosepak for pain relieve of the left lower quadrant with referred pain to the left shoulder     On 02/26/2021 WBC 01073, platelets 699120, hemoglobin 13, MCV 89, 39% neutrophils, 4% monocytes, 51% eosinophiles, 5% bands, 0% basophils with leukocytosis and eosinophilia, repeat CBC on 03/01/2021 showed WBC of 18425, platelets 255175, hemoglobin 13.1, 11% bands, 31% eosinophiles, 44% neutrophils     Flow cytometry on the peripheral blood showed no evidence of acute leukemia, lymphoma      Negative for bcr/ABL by PCR, PDGFR alpha, PDGFR beta    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 Labs:   Lab Results   Component Value Date    WBC 7.88 10/18/2023    HGB 15.5 (H) 10/18/2023    HCT 46.1 10/18/2023    MCV 92 10/18/2023     10/18/2023     Lab Results   Component Value Date     11/01/2015    K 5.2 10/13/2023     10/13/2023    CO2 21 10/13/2023    ANIONGAP 5 11/01/2015    BUN 15 10/13/2023    CREATININE 0.69 10/13/2023    GLUCOSE 98 11/01/2015    GLUF 106 (H) 10/13/2023    CALCIUM 9.4 10/13/2023    CORRECTEDCA 9.2 09/16/2021    AST 25 10/13/2023    ALT 19 10/13/2023    ALKPHOS 66 10/13/2023    PROT 7.5 11/01/2015    BILITOT 1.15 (H) 11/01/2015    EGFR 89 10/13/2023       No results found for: "IRON", "TIBC", "FERRITIN"    No results found for: "AVMRFTHY32"      ROS: Review of Systems   Constitutional:  Negative for appetite change, chills, diaphoresis, fatigue and unexpected weight change.    HENT:   Negative for mouth sores, nosebleeds, sore throat, trouble swallowing and voice change. Eyes:  Negative for eye problems and icterus. Respiratory:  Negative for chest tightness, cough, hemoptysis and wheezing. Cardiovascular:  Negative for chest pain, leg swelling and palpitations. Gastrointestinal:  Negative for abdominal distention, abdominal pain, blood in stool, constipation, diarrhea, nausea and vomiting. Endocrine: Negative for hot flashes. Genitourinary:  Negative for bladder incontinence, difficulty urinating, dyspareunia, dysuria and frequency. Musculoskeletal:  Negative for arthralgias, back pain, gait problem, neck pain and neck stiffness. Skin:  Negative for itching and rash. Neurological:  Negative for dizziness, gait problem, headaches, numbness, seizures and speech difficulty. Hematological:  Negative for adenopathy. Does not bruise/bleed easily. Psychiatric/Behavioral:  Negative for decreased concentration, depression, sleep disturbance and suicidal ideas. The patient is nervous/anxious. Current Medications: Reviewed  Allergies: Reviewed  PMH/FH/SH:  Reviewed      Physical Exam:    Body surface area is 1.72 meters squared. Wt Readings from Last 3 Encounters:   10/19/23 68.9 kg (151 lb 12.8 oz)   05/17/23 60.8 kg (134 lb)   04/24/23 60.8 kg (134 lb)        Temp Readings from Last 3 Encounters:   10/19/23 97.8 °F (36.6 °C) (Temporal)   05/17/23 97.5 °F (36.4 °C) (Temporal)   01/16/23 (!) 96.5 °F (35.8 °C) (Tympanic)        BP Readings from Last 3 Encounters:   10/19/23 118/70   05/17/23 120/70   04/24/23 118/64         Pulse Readings from Last 3 Encounters:   10/19/23 82   05/17/23 85   04/24/23 68        Physical Exam  Vitals reviewed. Constitutional:       General: She is not in acute distress. Appearance: She is well-developed. She is not diaphoretic. HENT:      Head: Normocephalic and atraumatic. Eyes:      Conjunctiva/sclera: Conjunctivae normal.   Neck:      Trachea: No tracheal deviation.    Cardiovascular: Rate and Rhythm: Normal rate and regular rhythm. Heart sounds: No murmur heard. No friction rub. No gallop. Pulmonary:      Effort: Pulmonary effort is normal. No respiratory distress. Breath sounds: Normal breath sounds. No wheezing or rales. Chest:      Chest wall: No tenderness. Abdominal:      General: There is no distension. Palpations: Abdomen is soft. Tenderness: There is no abdominal tenderness. Musculoskeletal:      Cervical back: Normal range of motion and neck supple. Lymphadenopathy:      Cervical: No cervical adenopathy. Skin:     General: Skin is warm and dry. Coloration: Skin is not pale. Findings: No erythema. Neurological:      Mental Status: She is alert and oriented to person, place, and time. Psychiatric:         Behavior: Behavior normal.         Thought Content: Thought content normal.         Judgment: Judgment normal.         ECO    Goals and Barriers:  Current Goal: Minimize effects of disease. Barriers: None. Patient's Capacity to Self Care:  Patient is able to self care.     Code Status: [unfilled]

## 2023-10-24 ENCOUNTER — HOSPITAL ENCOUNTER (OUTPATIENT)
Dept: CT IMAGING | Facility: HOSPITAL | Age: 68
Discharge: HOME | End: 2023-10-24
Attending: OBSTETRICS & GYNECOLOGY
Payer: COMMERCIAL

## 2023-10-24 DIAGNOSIS — C54.1 ENDOMETRIAL SARCOMA (HCC): ICD-10-CM

## 2023-10-24 PROCEDURE — 74177 CT ABD & PELVIS W/CONTRAST: CPT

## 2023-10-24 PROCEDURE — G1004 CDSM NDSC: HCPCS

## 2023-10-24 PROCEDURE — 71260 CT THORAX DX C+: CPT

## 2023-10-24 RX ADMIN — IOHEXOL 100 ML: 350 INJECTION, SOLUTION INTRAVENOUS at 13:53

## 2023-10-30 ENCOUNTER — OFFICE VISIT (OUTPATIENT)
Dept: GYNECOLOGIC ONCOLOGY | Facility: CLINIC | Age: 68
End: 2023-10-30
Payer: COMMERCIAL

## 2023-10-30 VITALS
WEIGHT: 150.6 LBS | RESPIRATION RATE: 16 BRPM | HEART RATE: 75 BPM | DIASTOLIC BLOOD PRESSURE: 82 MMHG | HEIGHT: 63 IN | SYSTOLIC BLOOD PRESSURE: 124 MMHG | OXYGEN SATURATION: 99 % | TEMPERATURE: 97.5 F | BODY MASS INDEX: 26.68 KG/M2

## 2023-10-30 DIAGNOSIS — C54.1 ENDOMETRIAL STROMAL SARCOMA (HCC): ICD-10-CM

## 2023-10-30 DIAGNOSIS — C54.9 UTERINE SARCOMA (HCC): Primary | ICD-10-CM

## 2023-10-30 PROCEDURE — 99213 OFFICE O/P EST LOW 20 MIN: CPT | Performed by: OBSTETRICS & GYNECOLOGY

## 2023-10-30 NOTE — ASSESSMENT & PLAN NOTE
Patient is a very pleasant 62-year-old female with a history of a uterine endometrial stromal sarcoma. She underwent surgery and chemotherapy and now remained stable without evidence of recurrence of disease.     We discussed follow-up and have recommended repeat CAT scan of chest abdomen pelvis in 6 months

## 2023-10-30 NOTE — PROGRESS NOTES
Assessment/Plan:    Diagnoses and all orders for this visit:    Uterine sarcoma ([address])  -     CT chest abdomen pelvis w contrast; Future  -     BUN; Future  -     Creatinine, serum; Future    Endometrial stromal sarcoma [facility])    Patient is a very pleasant 42-year-old female with a history of a uterine endometrial stromal sarcoma. She underwent surgery and chemotherapy and now remained stable without evidence of recurrence of disease. We discussed follow-up and have recommended repeat CAT scan of chest abdomen pelvis in 6 months      CHIEF COMPLAINT: Surveillance endometrial stromal sarcoma      Problem:  Cancer Staging   Endometrial stromal sarcoma [facility])  Staging form: Corpus Uteri - Sarcoma, AJCC 8th Edition  - Clinical stage from 4/16/2021: FIGO Stage IB (cT1b, cN0, cM0) - Signed by Tracy Brennan MD on 4/16/2021  - Pathologic: FIGO Stage IB (pT1b, pN0, cM0) - Signed by Tracy Brennan MD on 4/16/2021        Previous therapy:  Oncology History   Personal history of adenocarcinoma of breast    Initial Diagnosis    Malignant neoplasm of right breast, stage 1, estrogen receptor positive ([address])     11/7/2014 Surgery    Right mastectomy, SLNB. Left mastectomy, SLNB. Bilateral breast reconstruction with expanders. Dr Cheko Matta.     12/2014 -  Hormone Therapy    Anastrazole     3/13/2015 Surgery    Bilateral implants placed. Dr Cheko Matta.      Genetic Testing    BRCA negative     Endometrial stromal sarcoma ([address])   3/26/2021 Initial Diagnosis    Endometrial stromal sarcoma ([address])     3/26/2021 Surgery    Robot Hyst BSO with >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. Stage 1B.  Literature review to consider Chem RT BMJ 2019     4/16/2021 -  Cancer Staged    Staging form: Corpus Uteri - Sarcoma, AJCC 8th Edition  - Clinical stage from 4/16/2021: FIGO Stage IB (cT1b, cN0, cM0) - Signed by Tracy Brennan MD on 4/16/2021  Stage prefix: Initial diagnosis  Histologic grade (G): G3  Histologic grading system: 3 grade system       4/16/2021 -  Cancer Staged    Staging form: Corpus Uteri - Sarcoma, AJCC 8th Edition  - Pathologic: FIGO Stage IB (pT1b, pN0, cM0) - Signed by Morales Bethea MD on 4/16/2021  Histologic grade (G): G3  Histologic grading system: 3 grade system  Residual tumor (R): R0 - None       5/10/2021 - 8/31/2021 Chemotherapy    pegfilgrastim (NEULASTA), 4 mg (100 % of original dose 4 mg), Subcutaneous, Once, 2 of 2 cycles  Dose modification: 4 mg (original dose 4 mg, Cycle 5), 4 mg (original dose 4 mg, Cycle 6)  Administration: 4 mg (8/10/2021), 4 mg (8/31/2021)  pegfilgrastim (NEULASTA ONPRO), 6 mg, Subcutaneous, Once, 4 of 4 cycles  Administration: 6 mg (5/17/2021), 6 mg (6/8/2021), 6 mg (6/28/2021), 6 mg (7/19/2021)  DOCEtaxel (TAXOTERE) chemo infusion, 100 mg/m2 = 164 mg (133.3 % of original dose 75 mg/m2), Intravenous, Once, 6 of 6 cycles  Dose modification: 100 mg/m2 (original dose 75 mg/m2, Cycle 1, Reason: Other (Must fill in a comment))  Administration: 164 mg (5/17/2021), 160 mg (6/8/2021), 160 mg (6/28/2021), 165 mg (7/19/2021), 165 mg (8/9/2021), 169 mg (8/30/2021)  gemcitabine (GEMZAR) infusion, 900 mg/m2 = 1,493.9 mg (112.5 % of original dose 800 mg/m2), Intravenous, Once, 6 of 6 cycles  Dose modification: 900 mg/m2 (original dose 800 mg/m2, Cycle 1, Reason: Other (Must fill in a comment))  Administration: 1,493.9 mg (5/10/2021), 1,476 mg (5/17/2021), 1,400 mg (6/2/2021), 1,476 mg (6/8/2021), 1,400 mg (6/21/2021), 1,400 mg (6/28/2021), 1,400 mg (7/12/2021), 1,485.2 mg (7/19/2021), 1,485.2 mg (8/3/2021), 1,485.2 mg (8/9/2021), 1,600 mg (8/23/2021), 1,520.9 mg (8/30/2021)           Patient ID: Vicente Ruiz is a 70 y.o. female  Patient is a very pleasant 69-year-old female with a history of stage Ib endometrial stromal sarcoma status post robotic hysterectomy BSO and staging followed by 4 cycles of Taxotere gemcitabine chemotherapy in 2021.   Patient has been disease-free since that time. Patient also has a diagnosis of breast cancer. Patient presents today for surveillance. She has no new complaint. Today, the patient is doing well. She denies significant abdominal pain, pelvic pain, nausea, vomiting, constipation, diarrhea, fevers, chills, or vaginal bleeding. Recent CT scan of the chest abdomen pelvis reveals the following:  IMPRESSION:     No findings of metastatic disease in the chest abdomen or pelvis. Unchanged sub-5 mm pulmonary nodules over multiple prior studies, recommend attention on follow-up per the patient's primary malignancy. The following portions of the patient's history were reviewed and updated as appropriate: allergies, current medications, past family history, past medical history, past social history, past surgical history, and problem list.    Review of Systems    Current Outpatient Medications   Medication Sig Dispense Refill    Calcium Carb-Cholecalciferol (OSCAL-D) 500 mg-200 units per tablet Take 1 tablet by mouth 2 (two) times a day with meals      Cholecalciferol (Vitamin D3) 1.25 MG (83183 UT) TABS Take by mouth      glucosamine-chondroitin 500-400 MG tablet Take 1 tablet by mouth in the morning       No current facility-administered medications for this visit. Objective:    Blood pressure 124/82, pulse 75, temperature 97.5 °F (36.4 °C), temperature source Temporal, resp. rate 16, height 5' 3" (1.6 m), weight 68.3 kg (150 lb 9.6 oz), SpO2 99 %, not currently breastfeeding. Body mass index is 26.68 kg/m². Body surface area is 1.71 meters squared.     Physical Exam    Lab Results   Component Value Date     6.6 10/18/2023     Lab Results   Component Value Date     11/01/2015    K 5.2 10/13/2023     10/13/2023    CO2 21 10/13/2023    ANIONGAP 5 11/01/2015    BUN 15 10/13/2023    CREATININE 0.69 10/13/2023    GLUCOSE 98 11/01/2015    GLUF 106 (H) 10/13/2023    CALCIUM 9.4 10/13/2023    CORRECTEDCA 9.2 09/16/2021    AST 25 10/13/2023    ALT 19 10/13/2023    ALKPHOS 66 10/13/2023    PROT 7.5 11/01/2015    BILITOT 1.15 (H) 11/01/2015    EGFR 89 10/13/2023     Lab Results   Component Value Date    WBC 7.88 10/18/2023    HGB 15.5 (H) 10/18/2023    HCT 46.1 10/18/2023    MCV 92 10/18/2023     10/18/2023     Lab Results   Component Value Date    NEUTROABS 5.08 10/18/2023        Trend:  Lab Results   Component Value Date     6.6 10/18/2023     3.9 03/08/2022     3.6 12/14/2021     5.7 10/25/2021     26.8 09/09/2021     12.0 08/19/2021     6.8 08/02/2021     5.4 07/08/2021     6.9 06/17/2021     9.4 05/27/2021     12.6 05/05/2021

## 2023-10-30 NOTE — LETTER
October 30, 2023     [facility], [address] [place] [address]. [id number]    Patient: Rachael Burkitt   YOB: 1955   Date of Visit: 10/30/2023       Dear Dr. Aubrey Lyles:    Thank you for referring Lauren Beck to me for evaluation. Below are my notes for this consultation. If you have questions, please do not hesitate to call me. I look forward to following your patient along with you. Sincerely,        Treasure Gilbert MD        CC: No Recipients    Treasure Gilbert MD  10/30/2023  9:44 AM  Sign when Signing Visit  Assessment/Plan:    Diagnoses and all orders for this visit:    Uterine sarcoma ([address])  -     CT chest abdomen pelvis w contrast; Future  -     BUN; Future  -     Creatinine, serum; Future    Endometrial stromal sarcoma [facility])    Patient is a very pleasant 69-year-old female with a history of a uterine endometrial stromal sarcoma. She underwent surgery and chemotherapy and now remained stable without evidence of recurrence of disease. We discussed follow-up and have recommended repeat CAT scan of chest abdomen pelvis in 6 months      CHIEF COMPLAINT: Surveillance endometrial stromal sarcoma      Problem:  Cancer Staging   Endometrial stromal sarcoma [facility])  Staging form: Corpus Uteri - Sarcoma, AJCC 8th Edition  - Clinical stage from 4/16/2021: FIGO Stage IB (cT1b, cN0, cM0) - Signed by Treasure Gilbert MD on 4/16/2021  - Pathologic: FIGO Stage IB (pT1b, pN0, cM0) - Signed by Treasure Gilbert MD on 4/16/2021        Previous therapy:  Oncology History   Personal history of adenocarcinoma of breast    Initial Diagnosis    Malignant neoplasm of right breast, stage 1, estrogen receptor positive ([address])     11/7/2014 Surgery    Right mastectomy, SLNB. Left mastectomy, SLNB. Bilateral breast reconstruction with expanders. Dr Marge Cline.     12/2014 -  Hormone Therapy    Anastrazole     3/13/2015 Surgery    Bilateral implants placed.  Dr Marge Cline.      [facility] Testing    BRCA negative     Endometrial stromal sarcoma ([address])   3/26/2021 Initial Diagnosis    Endometrial stromal sarcoma ([address])     3/26/2021 Surgery    Robot Hyst BSO with >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. Stage 1B.  Literature review to consider Chem RT BMJ 2019     4/16/2021 -  Cancer Staged    Staging form: Corpus Uteri - Sarcoma, AJCC 8th Edition  - Clinical stage from 4/16/2021: FIGO Stage IB (cT1b, cN0, cM0) - Signed by Grecia Hendricks MD on 4/16/2021  Stage prefix: Initial diagnosis  Histologic grade (G): G3  Histologic grading system: 3 grade system       4/16/2021 -  Cancer Staged    Staging form: Corpus Uteri - Sarcoma, AJCC 8th Edition  - Pathologic: FIGO Stage IB (pT1b, pN0, cM0) - Signed by Grecia Hendricks MD on 4/16/2021  Histologic grade (G): G3  Histologic grading system: 3 grade system  Residual tumor (R): R0 - None       5/10/2021 - 8/31/2021 Chemotherapy    pegfilgrastim (NEULASTA), 4 mg (100 % of original dose 4 mg), Subcutaneous, Once, 2 of 2 cycles  Dose modification: 4 mg (original dose 4 mg, Cycle 5), 4 mg (original dose 4 mg, Cycle 6)  Administration: 4 mg (8/10/2021), 4 mg (8/31/2021)  pegfilgrastim (NEULASTA ONPRO), 6 mg, Subcutaneous, Once, 4 of 4 cycles  Administration: 6 mg (5/17/2021), 6 mg (6/8/2021), 6 mg (6/28/2021), 6 mg (7/19/2021)  DOCEtaxel (TAXOTERE) chemo infusion, 100 mg/m2 = 164 mg (133.3 % of original dose 75 mg/m2), Intravenous, Once, 6 of 6 cycles  Dose modification: 100 mg/m2 (original dose 75 mg/m2, Cycle 1, Reason: Other (Must fill in a comment))  Administration: 164 mg (5/17/2021), 160 mg (6/8/2021), 160 mg (6/28/2021), 165 mg (7/19/2021), 165 mg (8/9/2021), 169 mg (8/30/2021)  gemcitabine (GEMZAR) infusion, 900 mg/m2 = 1,493.9 mg (112.5 % of original dose 800 mg/m2), Intravenous, Once, 6 of 6 cycles  Dose modification: 900 mg/m2 (original dose 800 mg/m2, Cycle 1, Reason: Other (Must fill in a comment))  Administration: 1,493.9 mg (5/10/2021), 1,476 mg (5/17/2021), 1,400 mg (6/2/2021), 1,476 mg (6/8/2021), 1,400 mg (6/21/2021), 1,400 mg (6/28/2021), 1,400 mg (7/12/2021), 1,485.2 mg (7/19/2021), 1,485.2 mg (8/3/2021), 1,485.2 mg (8/9/2021), 1,600 mg (8/23/2021), 1,520.9 mg (8/30/2021)           Patient ID: Carmen Callejas is a 70 y.o. female  Patient is a very pleasant 68-year-old female with a history of stage Ib endometrial stromal sarcoma status post robotic hysterectomy BSO and staging followed by 4 cycles of Taxotere gemcitabine chemotherapy in 2021. Patient has been disease-free since that time. Patient also has a diagnosis of breast cancer. Patient presents today for surveillance. She has no new complaint. Today, the patient is doing well. She denies significant abdominal pain, pelvic pain, nausea, vomiting, constipation, diarrhea, fevers, chills, or vaginal bleeding. Recent CT scan of the chest abdomen pelvis reveals the following:  IMPRESSION:     No findings of metastatic disease in the chest abdomen or pelvis. Unchanged sub-5 mm pulmonary nodules over multiple prior studies, recommend attention on follow-up per the patient's primary malignancy. The following portions of the patient's history were reviewed and updated as appropriate: allergies, current medications, past family history, past medical history, past social history, past surgical history, and problem list.    Review of Systems    Current Outpatient Medications   Medication Sig Dispense Refill   • Calcium Carb-Cholecalciferol (OSCAL-D) 500 mg-200 units per tablet Take 1 tablet by mouth 2 (two) times a day with meals     • Cholecalciferol (Vitamin D3) 1.25 MG (04529 UT) TABS Take by mouth     • glucosamine-chondroitin 500-400 MG tablet Take 1 tablet by mouth in the morning       No current facility-administered medications for this visit.            Objective:    Blood pressure 124/82, pulse 75, temperature 97.5 °F (36.4 °C), temperature source Temporal, resp. rate 16, height 5' 3" (1.6 m), weight 68.3 kg (150 lb 9.6 oz), SpO2 99 %, not currently breastfeeding. Body mass index is 26.68 kg/m². Body surface area is 1.71 meters squared.     Physical Exam    Lab Results   Component Value Date     6.6 10/18/2023     Lab Results   Component Value Date     11/01/2015    K 5.2 10/13/2023     10/13/2023    CO2 21 10/13/2023    ANIONGAP 5 11/01/2015    BUN 15 10/13/2023    CREATININE 0.69 10/13/2023    GLUCOSE 98 11/01/2015    GLUF 106 (H) 10/13/2023    CALCIUM 9.4 10/13/2023    CORRECTEDCA 9.2 09/16/2021    AST 25 10/13/2023    ALT 19 10/13/2023    ALKPHOS 66 10/13/2023    PROT 7.5 11/01/2015    BILITOT 1.15 (H) 11/01/2015    EGFR 89 10/13/2023     Lab Results   Component Value Date    WBC 7.88 10/18/2023    HGB 15.5 (H) 10/18/2023    HCT 46.1 10/18/2023    MCV 92 10/18/2023     10/18/2023     Lab Results   Component Value Date    NEUTROABS 5.08 10/18/2023        Trend:  Lab Results   Component Value Date     6.6 10/18/2023     3.9 03/08/2022     3.6 12/14/2021     5.7 10/25/2021     26.8 09/09/2021     12.0 08/19/2021     6.8 08/02/2021     5.4 07/08/2021     6.9 06/17/2021     9.4 05/27/2021     12.6 05/05/2021

## 2023-11-14 ENCOUNTER — TELEPHONE (OUTPATIENT)
Dept: HEMATOLOGY ONCOLOGY | Facility: CLINIC | Age: 68
End: 2023-11-14

## 2023-11-15 ENCOUNTER — TELEPHONE (OUTPATIENT)
Dept: HEMATOLOGY ONCOLOGY | Facility: CLINIC | Age: 68
End: 2023-11-15

## 2023-11-15 NOTE — TELEPHONE ENCOUNTER
Appointment Change  Cancel, Reschedule, Change to Virtual      Who are you speaking with? Patient   If it is not the patient, is the caller listed on the communication consent form? N/A   Which provider is the appointment scheduled with? Dr. Luisa Hawkins   When was the original appointment scheduled? Please list date and time 11/16/23 @ 8:40am   At which location is the appointment scheduled to take place? [id number]   Was the appointment rescheduled? Was the appointment changed from an in person visit to a virtual visit? If so, please list the details of the change. No    What is the reason for the appointment change? Patient was seen 10/19/23 and Dr. Luisa Hawkins informed her no need to keep appt       Was STAR transport scheduled? no   Does STAR transport need to be scheduled for the new visit (if applicable) no   Does the patient need an infusion appointment rescheduled? no   Does the patient have an upcoming infusion appointment scheduled? If so, when? no   Is the patient undergoing chemotherapy? no   For appointments cancelled with less than 24 hours:  Was the no-show policy reviewed?  yes

## 2024-02-23 ENCOUNTER — HOSPITAL ENCOUNTER (OUTPATIENT)
Dept: RADIOLOGY | Facility: HOSPITAL | Age: 69
Discharge: HOME | End: 2024-02-23
Payer: COMMERCIAL

## 2024-02-23 DIAGNOSIS — K56.7 ILEUS (HCC): ICD-10-CM

## 2024-02-23 PROCEDURE — 74022 RADEX COMPL AQT ABD SERIES: CPT

## 2024-03-27 ENCOUNTER — APPOINTMENT (OUTPATIENT)
Dept: LAB | Facility: CLINIC | Age: 69
End: 2024-03-27
Payer: COMMERCIAL

## 2024-03-27 DIAGNOSIS — C54.9 UTERINE SARCOMA (HCC): ICD-10-CM

## 2024-03-27 DIAGNOSIS — E78.5 HYPERLIPIDEMIA, UNSPECIFIED HYPERLIPIDEMIA TYPE: ICD-10-CM

## 2024-03-27 DIAGNOSIS — D58.2 ELEVATED HEMOGLOBIN (HCC): ICD-10-CM

## 2024-03-27 LAB
BUN SERPL-MCNC: 16 MG/DL (ref 5–25)
CHOLEST SERPL-MCNC: 186 MG/DL
CREAT SERPL-MCNC: 0.73 MG/DL (ref 0.6–1.3)
GFR SERPL CREATININE-BSD FRML MDRD: 84 ML/MIN/1.73SQ M
HDLC SERPL-MCNC: 92 MG/DL
LDLC SERPL CALC-MCNC: 76 MG/DL (ref 0–100)
NONHDLC SERPL-MCNC: 94 MG/DL
TRIGL SERPL-MCNC: 90 MG/DL

## 2024-03-27 PROCEDURE — 80061 LIPID PANEL: CPT

## 2024-03-27 PROCEDURE — 84520 ASSAY OF UREA NITROGEN: CPT

## 2024-03-27 PROCEDURE — 36415 COLL VENOUS BLD VENIPUNCTURE: CPT

## 2024-03-27 PROCEDURE — 82565 ASSAY OF CREATININE: CPT

## 2024-04-05 LAB — JAK2 P.V617F BLD/T QL: NORMAL

## 2024-04-15 ENCOUNTER — HOSPITAL ENCOUNTER (OUTPATIENT)
Dept: CT IMAGING | Facility: HOSPITAL | Age: 69
Discharge: HOME | End: 2024-04-15
Attending: OBSTETRICS & GYNECOLOGY
Payer: COMMERCIAL

## 2024-04-15 DIAGNOSIS — C54.9 UTERINE SARCOMA (HCC): ICD-10-CM

## 2024-04-15 PROCEDURE — G1004 CDSM NDSC: HCPCS

## 2024-04-15 PROCEDURE — 74177 CT ABD & PELVIS W/CONTRAST: CPT

## 2024-04-15 PROCEDURE — 71260 CT THORAX DX C+: CPT

## 2024-04-15 RX ADMIN — IOHEXOL 100 ML: 350 INJECTION, SOLUTION INTRAVENOUS at 13:09

## 2024-04-29 ENCOUNTER — OFFICE VISIT (OUTPATIENT)
Dept: GYNECOLOGIC ONCOLOGY | Facility: CLINIC | Age: 69
End: 2024-04-29
Payer: COMMERCIAL

## 2024-04-29 VITALS
DIASTOLIC BLOOD PRESSURE: 80 MMHG | HEART RATE: 75 BPM | BODY MASS INDEX: 26.72 KG/M2 | HEIGHT: 63 IN | OXYGEN SATURATION: 98 % | TEMPERATURE: 98 F | SYSTOLIC BLOOD PRESSURE: 118 MMHG | WEIGHT: 150.8 LBS

## 2024-04-29 DIAGNOSIS — C54.9 UTERINE SARCOMA (HCC): Primary | ICD-10-CM

## 2024-04-29 DIAGNOSIS — C54.1 ENDOMETRIAL STROMAL SARCOMA (HCC): ICD-10-CM

## 2024-04-29 PROCEDURE — 99214 OFFICE O/P EST MOD 30 MIN: CPT | Performed by: OBSTETRICS & GYNECOLOGY

## 2024-04-29 NOTE — PROGRESS NOTES
Assessment/Plan:    Problem List Items Addressed This Visit       Endometrial stromal sarcoma (HCC)     Patient is a very pleasant 68-year-old female with a history of high-grade endometrial stromal sarcoma stage Ib status post robot-assisted total laparoscopic hysterectomy bilateral salpingo-oophorectomy and 6 cycles of gemcitabine and cisplatin.  She remains disease-free without evidence of recurrence of disease.  She will follow-up in 6 months for repeat evaluation with CAT scan.    Patient will follow-up with GI medicine for continued evaluation of GI related issues.          Other Visit Diagnoses       Uterine sarcoma (HCC)    -  Primary    Relevant Orders    CT chest abdomen pelvis w contrast    BUN    Creatinine, serum              CHIEF COMPLAINT: Surveillance high-grade endometrial stromal sarcoma      Problem:  Cancer Staging   Endometrial stromal sarcoma (HCC)  Staging form: Corpus Uteri - Sarcoma, AJCC 8th Edition  - Clinical stage from 4/16/2021: FIGO Stage IB (cT1b, cN0, cM0) - Signed by Bobby Ibanez MD on 4/16/2021  - Pathologic: FIGO Stage IB (pT1b, pN0, cM0) - Signed by Bobby Ibanez MD on 4/16/2021        Previous therapy:  Oncology History   Personal history of adenocarcinoma of breast    Initial Diagnosis    Malignant neoplasm of right breast, stage 1, estrogen receptor positive (HCC)     11/7/2014 Surgery    Right mastectomy, SLNB.  Left mastectomy, SLNB. Bilateral breast reconstruction with expanders.  Dr Brand     12/2014 -  Hormone Therapy    Anastrazole     3/13/2015 Surgery    Bilateral implants placed. Dr Soriano.      Genetic Testing    BRCA negative     Endometrial stromal sarcoma (HCC)   3/26/2021 Initial Diagnosis    Endometrial stromal sarcoma (HCC)     3/26/2021 Surgery    Robot Hyst BSO with >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. Stage 1B. Literature review to consider Chem RT BMJ 2019     4/16/2021 -  Cancer Staged    Staging form: Corpus  Uteri - Sarcoma, AJCC 8th Edition  - Clinical stage from 4/16/2021: FIGO Stage IB (cT1b, cN0, cM0) - Signed by Bobby Ibanez MD on 4/16/2021  Stage prefix: Initial diagnosis  Histologic grade (G): G3  Histologic grading system: 3 grade system       4/16/2021 -  Cancer Staged    Staging form: Corpus Uteri - Sarcoma, AJCC 8th Edition  - Pathologic: FIGO Stage IB (pT1b, pN0, cM0) - Signed by Bobby Ibanez MD on 4/16/2021  Histologic grade (G): G3  Histologic grading system: 3 grade system  Residual tumor (R): R0 - None       5/10/2021 - 8/31/2021 Chemotherapy    pegfilgrastim (NEULASTA), 4 mg (100 % of original dose 4 mg), Subcutaneous, Once, 2 of 2 cycles  Dose modification: 4 mg (original dose 4 mg, Cycle 5), 4 mg (original dose 4 mg, Cycle 6)  Administration: 4 mg (8/10/2021), 4 mg (8/31/2021)  pegfilgrastim (NEULASTA ONPRO), 6 mg, Subcutaneous, Once, 4 of 4 cycles  Administration: 6 mg (5/17/2021), 6 mg (6/8/2021), 6 mg (6/28/2021), 6 mg (7/19/2021)  DOCEtaxel (TAXOTERE) chemo infusion, 100 mg/m2 = 164 mg (133.3 % of original dose 75 mg/m2), Intravenous, Once, 6 of 6 cycles  Dose modification: 100 mg/m2 (original dose 75 mg/m2, Cycle 1, Reason: Other (Must fill in a comment))  Administration: 164 mg (5/17/2021), 160 mg (6/8/2021), 160 mg (6/28/2021), 165 mg (7/19/2021), 165 mg (8/9/2021), 169 mg (8/30/2021)  gemcitabine (GEMZAR) infusion, 900 mg/m2 = 1,493.9 mg (112.5 % of original dose 800 mg/m2), Intravenous, Once, 6 of 6 cycles  Dose modification: 900 mg/m2 (original dose 800 mg/m2, Cycle 1, Reason: Other (Must fill in a comment))  Administration: 1,493.9 mg (5/10/2021), 1,476 mg (5/17/2021), 1,400 mg (6/2/2021), 1,476 mg (6/8/2021), 1,400 mg (6/21/2021), 1,400 mg (6/28/2021), 1,400 mg (7/12/2021), 1,485.2 mg (7/19/2021), 1,485.2 mg (8/3/2021), 1,485.2 mg (8/9/2021), 1,600 mg (8/23/2021), 1,520.9 mg (8/30/2021)           Patient ID: Suzanne W Kocher is a 68 y.o. female  Patient is a very pleasant  68-year-old female with a history of high-grade endometrial stromal sarcoma stage Ib.  She underwent surgery followed by 46cycles of Taxotere Gemzar and remains in a complete remission.  The patient's original diagnosis was in 2021 she has remained in remission since that time.  Recent CT scan of the chest abdomen pelvis was unremarkable without evidence of recurrence of disease.  Narrative & Impression  CT CHEST, ABDOMEN AND PELVIS WITH IV CONTRAST       FINDINGS:     CHEST     LUNGS: Stable scattered bilateral sub-5mm pulmonary nodules since at least December 2021. No new or enlarging nodules. No focal consolidation. Central airways are clear.     PLEURA: Unremarkable.     HEART/GREAT VESSELS: Heart is unremarkable for patient's age. No thoracic aortic aneurysm.     MEDIASTINUM AND MICHELL: Unremarkable.     CHEST WALL AND LOWER NECK:   Stable subcentimeter low density left thyroid nodule. Rim calcified focal fat necrosis in the left axilla is unchanged. Bilateral breast prostheses.     ABDOMEN     LIVER/BILIARY TREE: Stable small liver lesions in keeping with hepatic cysts and others which are too small to definitively characterize. No suspicious liver lesions.     GALLBLADDER: No calcified gallstones. No pericholecystic inflammatory change.     SPLEEN: Unremarkable.     PANCREAS: Unremarkable.     ADRENAL GLANDS: Unremarkable.     KIDNEYS/URETERS: No hydronephrosis or urinary tract calculi. Subcentimeter hypoattenuating renal lesion(s), too small to characterize but statistically likely benign, which do not warrant follow-up (Radiology June 2019).     STOMACH AND BOWEL: Unremarkable.     APPENDIX: No findings to suggest appendicitis.     ABDOMINOPELVIC CAVITY: No ascites. No pneumoperitoneum. No lymphadenopathy.     VESSELS: Unremarkable for patient's age.     PELVIS     REPRODUCTIVE ORGANS: Post hysterectomy.     URINARY BLADDER: Unremarkable.     ABDOMINAL WALL/INGUINAL REGIONS: Unremarkable.     BONES: No acute  "fracture or suspicious osseous lesion.     IMPRESSION:     No evidence of metastatic disease in the chest, abdomen or pelvis. Stable incidental findings as above.    Today, the patient is doing well.  She denies significant abdominal pain, pelvic pain, nausea, vomiting, constipation, diarrhea, fevers, chills, or vaginal bleeding.     Today, the patient is doing well.  She denies significant abdominal pain, pelvic pain, nausea, vomiting, constipation, diarrhea, fevers, chills, or vaginal bleeding.  Patient has undergone several colonoscopies for GI related issues.  She is under going at third in the next several months.                  The following portions of the patient's history were reviewed and updated as appropriate: allergies, current medications, past family history, past medical history, past social history, past surgical history, and problem list.    Review of Systems   Constitutional: Negative.    HENT: Negative.     Eyes: Negative.    Respiratory: Negative.     Cardiovascular: Negative.    Gastrointestinal: Negative.    Endocrine: Negative.    Genitourinary: Negative.    Musculoskeletal: Negative.    Skin: Negative.    Neurological: Negative.    Hematological: Negative.    Psychiatric/Behavioral: Negative.         Current Outpatient Medications   Medication Sig Dispense Refill    Calcium Carb-Cholecalciferol (OSCAL-D) 500 mg-200 units per tablet Take 1 tablet by mouth 2 (two) times a day with meals      Cholecalciferol (Vitamin D3) 1.25 MG (43573 UT) TABS Take by mouth      glucosamine-chondroitin 500-400 MG tablet Take 1 tablet by mouth in the morning       No current facility-administered medications for this visit.           Objective:    Blood pressure 118/80, pulse 75, temperature 98 °F (36.7 °C), height 5' 3\" (1.6 m), weight 68.4 kg (150 lb 12.8 oz), SpO2 98%, not currently breastfeeding.  Body mass index is 26.71 kg/m².  Body surface area is 1.72 meters squared.    Physical Exam  Constitutional:  "      Appearance: She is well-developed.   HENT:      Head: Normocephalic and atraumatic.   Neck:      Thyroid: No thyromegaly.   Cardiovascular:      Rate and Rhythm: Normal rate and regular rhythm.      Heart sounds: Normal heart sounds.   Pulmonary:      Effort: Pulmonary effort is normal.      Breath sounds: Normal breath sounds.   Abdominal:      General: Bowel sounds are normal.      Palpations: Abdomen is soft.      Comments: Well healed laparoscopic incisions.   Genitourinary:     Comments: -Normal external female genitalia, normal Bartholin's and Salvisa's glands                  -Normal midline urethral meatus. No lesions notes                  -Bladder without fullness mass or tenderness                  -Vagina without lesion or discharge No significant cystocele or rectocele noted                  -Cervix surgically absent                  -Uterus surgically absent                  -Adnexae surgically absent                  - Anus without fissure of lesion    Musculoskeletal:         General: Normal range of motion.      Cervical back: Normal range of motion and neck supple.   Lymphadenopathy:      Cervical: No cervical adenopathy.   Skin:     General: Skin is warm and dry.   Neurological:      Mental Status: She is alert and oriented to person, place, and time.   Psychiatric:         Behavior: Behavior normal.         Lab Results   Component Value Date     6.6 10/18/2023     Lab Results   Component Value Date     11/01/2015    K 5.2 10/13/2023     10/13/2023    CO2 21 10/13/2023    ANIONGAP 5 11/01/2015    BUN 16 03/27/2024    CREATININE 0.73 03/27/2024    GLUCOSE 98 11/01/2015    GLUF 106 (H) 10/13/2023    CALCIUM 9.4 10/13/2023    CORRECTEDCA 9.2 09/16/2021    AST 25 10/13/2023    ALT 19 10/13/2023    ALKPHOS 66 10/13/2023    PROT 7.5 11/01/2015    BILITOT 1.15 (H) 11/01/2015    EGFR 84 03/27/2024     Lab Results   Component Value Date    WBC 7.88 10/18/2023    HGB 15.5 (H) 10/18/2023     HCT 46.1 10/18/2023    MCV 92 10/18/2023     10/18/2023     Lab Results   Component Value Date    NEUTROABS 5.08 10/18/2023        Trend:  Lab Results   Component Value Date     6.6 10/18/2023     3.9 03/08/2022     3.6 12/14/2021     5.7 10/25/2021     26.8 09/09/2021     12.0 08/19/2021     6.8 08/02/2021     5.4 07/08/2021     6.9 06/17/2021     9.4 05/27/2021     12.6 05/05/2021

## 2024-04-29 NOTE — ASSESSMENT & PLAN NOTE
Patient is a very pleasant 68-year-old female with a history of high-grade endometrial stromal sarcoma stage Ib status post robot-assisted total laparoscopic hysterectomy bilateral salpingo-oophorectomy and 6 cycles of gemcitabine and cisplatin.  She remains disease-free without evidence of recurrence of disease.  She will follow-up in 6 months for repeat evaluation with CAT scan.    Patient will follow-up with GI medicine for continued evaluation of GI related issues.

## 2024-05-21 ENCOUNTER — PATIENT OUTREACH (OUTPATIENT)
Dept: CASE MANAGEMENT | Facility: OTHER | Age: 69
End: 2024-05-21

## 2024-05-21 ENCOUNTER — OFFICE VISIT (OUTPATIENT)
Dept: SURGICAL ONCOLOGY | Facility: CLINIC | Age: 69
End: 2024-05-21
Payer: COMMERCIAL

## 2024-05-21 VITALS
TEMPERATURE: 97.9 F | RESPIRATION RATE: 16 BRPM | WEIGHT: 151 LBS | BODY MASS INDEX: 26.75 KG/M2 | HEART RATE: 86 BPM | OXYGEN SATURATION: 98 % | DIASTOLIC BLOOD PRESSURE: 84 MMHG | HEIGHT: 63 IN | SYSTOLIC BLOOD PRESSURE: 126 MMHG

## 2024-05-21 DIAGNOSIS — Z85.3 PERSONAL HISTORY OF ADENOCARCINOMA OF BREAST: ICD-10-CM

## 2024-05-21 DIAGNOSIS — Z85.3 ENCOUNTER FOR FOLLOW-UP SURVEILLANCE OF BREAST CANCER: Primary | ICD-10-CM

## 2024-05-21 DIAGNOSIS — Z08 ENCOUNTER FOR FOLLOW-UP SURVEILLANCE OF BREAST CANCER: Primary | ICD-10-CM

## 2024-05-21 PROCEDURE — 99215 OFFICE O/P EST HI 40 MIN: CPT | Performed by: NURSE PRACTITIONER

## 2024-05-21 NOTE — PROGRESS NOTES
Assessment/Plan:    Diagnoses and all orders for this visit:    Encounter for follow-up surveillance of breast cancer    Personal history of adenocarcinoma of breast  -     Ambulatory Referral to Oncology Genetics; Future  -     Ambulatory referral to Physical Therapy; Future  -     Ambulatory referral to oncology social worker; Future      Patient is a 68-year-old female that was diagnosed with a right-sided breast cancer in October 2014.  Her pathology revealed invasive ductal carcinoma, ER/KS positive, HER2 negative.  She underwent a right mastectomy and sentinel lymph node biopsy and a prophylactic left mastectomy.  She had reconstruction with Dr. Soriano.  She completed five years of adjuvant anastrozole therapy and is now on observation.  She had undergone BRCA testing which was negative in 2016.  She was diagnosed with an endometrial stromal sarcoma in March 2021 status post surgery and chemotherapy.  Breast cancer survivorship visit performed today and treatment summary and care plan were reviewed with patient. She offers no new complaints today and there are no worrisome findings on today's clinical exam. I am referring her back to oncology genetics for updated panel testing given her personal and family history of cancers.  She is scheduled for a repeat colonoscopy in early June and is going to address updated osteoporosis screening with her endocrinologist.  She is interested in the strength [facility] program, referral placed.  We will plan to see her back in 1 year or sooner should the need arise.  She was instructed to contact us with any changes or concerns in the interim.  All of her questions were answered today.    REASON FOR VISIT:   Survivorship      Previous therapy:  Oncology History   Personal history of adenocarcinoma of breast   10/3/2014 Biopsy    Right breast biopsy:  - Ductal carcinoma in situ  Grade 1  %  %     11/7/2014 Surgery    Right mastectomy with sentinel lymph node biopsy:  -  Invasive ductal carcinoma with tubular features  2 foci 9mm and 1.7mm  Grade 1  %  SC 95%  HER2 FISH negative    - Clear margins  - 0/8 lymph nodes  at least Stage IA - pT1b, pN0, G1.     Left mastectomy  - Intraductal hyperplasia with atypia.     Bilateral breast reconstruction with expanders  Dr. Cassandra Dr Jan 12/2014 - 1/2020 Hormone Therapy    Anastrozole  Dr. Cota     3/13/2015 Surgery    Bilateral implants placed. Dr Brand     3/11/2016 Surgery    Bilateral fat grafting     10/2016 Genetic Testing    [facility]  BRCA1, BRCA2   negative     3/17/2017 Surgery    Bilateral fat grafting     Endometrial stromal sarcoma (HCC)   10/3/2014 Biopsy    Right breast biopsy:  - Ductal carcinoma in situ  Grade 1  %  %     3/26/2021 Initial Diagnosis    Endometrial stromal sarcoma (HCC)     3/26/2021 Surgery    Robot Hyst BSO with >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. Stage 1B. Literature review to consider Chem RT BMJ 2019     4/16/2021 -  Cancer Staged    Staging form: Corpus Uteri - Sarcoma, AJCC 8th Edition  - Clinical stage from 4/16/2021: FIGO Stage IB (cT1b, cN0, cM0) - Signed by Bobby Ibanez MD on 4/16/2021  Stage prefix: Initial diagnosis  Histologic grade (G): G3  Histologic grading system: 3 grade system       4/16/2021 -  Cancer Staged    Staging form: Corpus Uteri - Sarcoma, AJCC 8th Edition  - Pathologic: FIGO Stage IB (pT1b, pN0, cM0) - Signed by Bobby Ibanez MD on 4/16/2021  Histologic grade (G): G3  Histologic grading system: 3 grade system  Residual tumor (R): R0 - None       5/10/2021 - 8/31/2021 Chemotherapy    pegfilgrastim (NEULASTA), 4 mg (100 % of original dose 4 mg), Subcutaneous, Once, 2 of 2 cycles  Dose modification: 4 mg (original dose 4 mg, Cycle 5), 4 mg (original dose 4 mg, Cycle 6)  Administration: 4 mg (8/10/2021), 4 mg (8/31/2021)  pegfilgrastim (NEULASTA ONPRO), 6 mg, Subcutaneous, Once, 4 of 4 cycles  Administration: 6  mg (5/17/2021), 6 mg (6/8/2021), 6 mg (6/28/2021), 6 mg (7/19/2021)  DOCEtaxel (TAXOTERE) chemo infusion, 100 mg/m2 = 164 mg (133.3 % of original dose 75 mg/m2), Intravenous, Once, 6 of 6 cycles  Dose modification: 100 mg/m2 (original dose 75 mg/m2, Cycle 1, Reason: Other (Must fill in a comment))  Administration: 164 mg (5/17/2021), 160 mg (6/8/2021), 160 mg (6/28/2021), 165 mg (7/19/2021), 165 mg (8/9/2021), 169 mg (8/30/2021)  gemcitabine (GEMZAR) infusion, 900 mg/m2 = 1,493.9 mg (112.5 % of original dose 800 mg/m2), Intravenous, Once, 6 of 6 cycles  Dose modification: 900 mg/m2 (original dose 800 mg/m2, Cycle 1, Reason: Other (Must fill in a comment))  Administration: 1,493.9 mg (5/10/2021), 1,476 mg (5/17/2021), 1,400 mg (6/2/2021), 1,476 mg (6/8/2021), 1,400 mg (6/21/2021), 1,400 mg (6/28/2021), 1,400 mg (7/12/2021), 1,485.2 mg (7/19/2021), 1,485.2 mg (8/3/2021), 1,485.2 mg (8/9/2021), 1,600 mg (8/23/2021), 1,520.9 mg (8/30/2021)           Patient ID: Suzanne W Kocher is a 68 y.o. female  Presenting today for a breast cancer survivorship visit. She has not appreciated any changes on self exam. Denies headaches, back pain, bone pain, cough, shortness of breath, abdominal pain. She continues to follow with gyn oncology for uterine cancer surveillance.             Review of Systems   Constitutional:  Negative for activity change, appetite change, chills, fatigue, fever and unexpected weight change.   Respiratory:  Negative for cough and shortness of breath.    Cardiovascular:  Negative for chest pain.   Gastrointestinal:  Negative for abdominal pain, constipation, diarrhea, nausea and vomiting.   Genitourinary:  Vaginal pain: dryness.   Musculoskeletal:  Negative for arthralgias, back pain, gait problem and myalgias.   Skin:  Negative for color change and rash.   Neurological:  Negative for dizziness and headaches.   Hematological:  Negative for adenopathy.   Psychiatric/Behavioral:  Negative for agitation and  "confusion.    All other systems reviewed and are negative.      Objective:    Blood pressure 126/84, pulse 86, temperature 97.9 °F (36.6 °C), temperature source Temporal, resp. rate 16, height 5' 3\" (1.6 m), weight 68.5 kg (151 lb), SpO2 98%, not currently breastfeeding.  Body mass index is 26.75 kg/m².      Physical Exam  Vitals reviewed.   Constitutional:       General: She is not in acute distress.     Appearance: Normal appearance. She is well-developed. She is not diaphoretic.   HENT:      Head: Normocephalic and atraumatic.   Cardiovascular:      Rate and Rhythm: Normal rate and regular rhythm.      Heart sounds: Normal heart sounds.   Pulmonary:      Effort: Pulmonary effort is normal.      Breath sounds: Normal breath sounds.   Chest:      Comments: Bilateral reconstructed breasts with implants present.  There are no dominant masses, skin changes or nodules.  Abdominal:      Palpations: Abdomen is soft. There is no mass.      Tenderness: There is no abdominal tenderness.   Musculoskeletal:         General: Normal range of motion.      Cervical back: Normal range of motion.   Lymphadenopathy:      Upper Body:      Right upper body: No supraclavicular or axillary adenopathy.      Left upper body: No supraclavicular or axillary adenopathy.   Skin:     General: Skin is warm and dry.      Findings: No rash.   Neurological:      Mental Status: She is alert and oriented to person, place, and time.   Psychiatric:         Speech: Speech normal.         Discussed symptoms related to disease recurrence, Yes    Evaluated for late effects related to cancer treatment, Yes, describe: discussed effects of surgery, AI therapy    Screening current for cervical cancer, Yes, describe: n/a    Screening current for colon cancer, Yes, describe: had colonoscopy in April, scheduled for repeat in early June    Cancer rehabilitation services addressed, Yes, describe: referral placed to [facility]    Screening current for " osteoporosis, No pt to f/u with endocrinologist    Oncology Treatment Summary reviewed with patient and copy provided, Yes    Referral placed for psychosocial evaluation/screening to oncology social work  Yes    I have spent 45 minutes with Patient  today in which greater than 50% of this time was spent in counseling/coordination of care regarding breast cancer survivorship.

## 2024-05-22 ENCOUNTER — TELEPHONE (OUTPATIENT)
Dept: HEMATOLOGY ONCOLOGY | Facility: CLINIC | Age: 69
End: 2024-05-22

## 2024-05-22 NOTE — TELEPHONE ENCOUNTER
I spoke with Fabiana to schedule their consultation with Cancer Risk and Genetics.     Scheduling Outcome: Patient is scheduled for an appointment on 01/14/2024 at 1:30PM with Yvonne Gaona    Personal/Family History Related to Appointment:     Personal History of Cancer: Patient reports personal history of Breast ca and endometrial ca.     Family History of Cancer: Patient reports family history of breast ca- mother, [place] grandmother, [place] aunt. Prostate ca- father.     Is patient of Ashkenazi Catholic Heritage?: No    History of Genetic Testing:  Personal History of Genetic Testing: Patient report no personal history of Genetic Testing.    Family History of Genetic Testing: Patient reports that no family members have had Genetic Testing.     Patient's preferred e-mail address:   [email or web address]

## 2024-05-30 ENCOUNTER — PATIENT OUTREACH (OUTPATIENT)
Dept: CASE MANAGEMENT | Facility: OTHER | Age: 69
End: 2024-05-30

## 2024-06-05 ENCOUNTER — PATIENT OUTREACH (OUTPATIENT)
Dept: CASE MANAGEMENT | Facility: OTHER | Age: 69
End: 2024-06-05

## 2024-06-05 NOTE — PROGRESS NOTES
OSW placed third outreach TC this afternoon for survivorship. OSW received pts VM. Detailed VM was provided.

## 2024-06-12 ENCOUNTER — PATIENT OUTREACH (OUTPATIENT)
Dept: CASE MANAGEMENT | Facility: OTHER | Age: 69
End: 2024-06-12

## 2024-06-12 NOTE — PROGRESS NOTES
OSW placed three outreach TC's to the patient for survivorship. She has not returned any of the calls, therefore the SW referral will be closed.

## 2024-10-09 ENCOUNTER — APPOINTMENT (OUTPATIENT)
Dept: LAB | Facility: CLINIC | Age: 69
End: 2024-10-09
Payer: COMMERCIAL

## 2024-10-09 DIAGNOSIS — C54.9 UTERINE SARCOMA (HCC): ICD-10-CM

## 2024-10-09 LAB
BUN SERPL-MCNC: 15 MG/DL (ref 5–25)
CREAT SERPL-MCNC: 0.62 MG/DL (ref 0.6–1.3)
GFR SERPL CREATININE-BSD FRML MDRD: 91 ML/MIN/1.73SQ M

## 2024-10-09 PROCEDURE — 36415 COLL VENOUS BLD VENIPUNCTURE: CPT

## 2024-10-09 PROCEDURE — 84520 ASSAY OF UREA NITROGEN: CPT

## 2024-10-09 PROCEDURE — 82565 ASSAY OF CREATININE: CPT

## 2024-10-14 ENCOUNTER — HOSPITAL ENCOUNTER (OUTPATIENT)
Dept: CT IMAGING | Facility: HOSPITAL | Age: 69
Discharge: HOME | End: 2024-10-14
Attending: OBSTETRICS & GYNECOLOGY
Payer: COMMERCIAL

## 2024-10-14 DIAGNOSIS — C54.9 UTERINE SARCOMA (HCC): ICD-10-CM

## 2024-10-14 PROCEDURE — 74177 CT ABD & PELVIS W/CONTRAST: CPT

## 2024-10-14 PROCEDURE — 71260 CT THORAX DX C+: CPT

## 2024-10-14 RX ADMIN — IOHEXOL 100 ML: 350 INJECTION, SOLUTION INTRAVENOUS at 13:32

## 2024-10-28 ENCOUNTER — OFFICE VISIT (OUTPATIENT)
Dept: GYNECOLOGIC ONCOLOGY | Facility: CLINIC | Age: 69
End: 2024-10-28
Payer: COMMERCIAL

## 2024-10-28 VITALS
RESPIRATION RATE: 16 BRPM | HEART RATE: 83 BPM | BODY MASS INDEX: 27.07 KG/M2 | WEIGHT: 152.8 LBS | DIASTOLIC BLOOD PRESSURE: 84 MMHG | OXYGEN SATURATION: 100 % | TEMPERATURE: 98.2 F | HEIGHT: 63 IN | SYSTOLIC BLOOD PRESSURE: 134 MMHG

## 2024-10-28 DIAGNOSIS — C54.1 ENDOMETRIAL STROMAL SARCOMA (HCC): Primary | ICD-10-CM

## 2024-10-28 PROCEDURE — 99214 OFFICE O/P EST MOD 30 MIN: CPT | Performed by: OBSTETRICS & GYNECOLOGY

## 2024-10-28 NOTE — ASSESSMENT & PLAN NOTE
Has The Growth Been Previously Biopsied?: has been previously biopsied Patient is a very pleasant 69-year-old female with a history of a large endometrial stromal sarcoma stage Ib.  She underwent robot hysterectomy BSO followed by Taxotere Gemzar.  She remains disease-free.  She will follow-up in 6 months for repeat evaluation with CAT scan.

## 2024-10-28 NOTE — PROGRESS NOTES
Assessment/Plan:    Problem List Items Addressed This Visit       Endometrial stromal sarcoma (HCC) - Primary     Patient is a very pleasant 69-year-old female with a history of a large endometrial stromal sarcoma stage Ib.  She underwent robot hysterectomy BSO followed by Taxotere Gemzar.  She remains disease-free.  She will follow-up in 6 months for repeat evaluation with CAT scan.         Relevant Orders    CT chest abdomen pelvis w contrast    BUN    Creatinine, serum         CHIEF COMPLAINT: Surveillance endometrial stromal sarcoma      Problem:  Cancer Staging   Endometrial stromal sarcoma (HCC)  Staging form: Corpus Uteri - Sarcoma, AJCC 8th Edition  - Clinical stage from 4/16/2021: FIGO Stage IB (cT1b, cN0, cM0) - Signed by Bobby Ibanez MD on 4/16/2021  - Pathologic: FIGO Stage IB (pT1b, pN0, cM0) - Signed by Bobby Ibanez MD on 4/16/2021        Previous therapy:  Oncology History   Personal history of adenocarcinoma of breast   10/3/2014 Biopsy    Right breast biopsy:  - Ductal carcinoma in situ  Grade 1  %  %     11/7/2014 Surgery    Right mastectomy with sentinel lymph node biopsy:  - Invasive ductal carcinoma with tubular features  2 foci 9mm and 1.7mm  Grade 1  %  UT 95%  HER2 FISH negative    - Clear margins  - 0/8 lymph nodes  at least Stage IA - pT1b, pN0, G1.     Left mastectomy  - Intraductal hyperplasia with atypia.     Bilateral breast reconstruction with expanders  Dr. Cassandra Dr Jan 12/2014 - 1/2020 Hormone Therapy    Anastrozole  Dr. Cota     3/13/2015 Surgery    Bilateral implants placed. Dr Brand     3/11/2016 Surgery    Bilateral fat grafting     10/2016 Genetic Testing    Ambry  BRCA1, BRCA2   negative     3/17/2017 Surgery    Bilateral fat grafting     Endometrial stromal sarcoma (HCC)   10/3/2014 Biopsy    Right breast biopsy:  - Ductal carcinoma in situ  Grade 1  %  %     3/26/2021 Initial Diagnosis    Endometrial stromal sarcoma (HCC)      3/26/2021 Surgery    Robot Hyst BSO with >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. Stage 1B. Literature review to consider Chem RT BMJ 2019     4/16/2021 -  Cancer Staged    Staging form: Corpus Uteri - Sarcoma, AJCC 8th Edition  - Clinical stage from 4/16/2021: FIGO Stage IB (cT1b, cN0, cM0) - Signed by Bobby Ibanez MD on 4/16/2021  Stage prefix: Initial diagnosis  Histologic grade (G): G3  Histologic grading system: 3 grade system       4/16/2021 -  Cancer Staged    Staging form: Corpus Uteri - Sarcoma, AJCC 8th Edition  - Pathologic: FIGO Stage IB (pT1b, pN0, cM0) - Signed by Bobby Ibanez MD on 4/16/2021  Histologic grade (G): G3  Histologic grading system: 3 grade system  Residual tumor (R): R0 - None       5/10/2021 - 8/31/2021 Chemotherapy    pegfilgrastim (NEULASTA), 4 mg (100 % of original dose 4 mg), Subcutaneous, Once, 2 of 2 cycles  Dose modification: 4 mg (original dose 4 mg, Cycle 5), 4 mg (original dose 4 mg, Cycle 6)  Administration: 4 mg (8/10/2021), 4 mg (8/31/2021)  pegfilgrastim (NEULASTA ONPRO), 6 mg, Subcutaneous, Once, 4 of 4 cycles  Administration: 6 mg (5/17/2021), 6 mg (6/8/2021), 6 mg (6/28/2021), 6 mg (7/19/2021)  DOCEtaxel (TAXOTERE) chemo infusion, 100 mg/m2 = 164 mg (133.3 % of original dose 75 mg/m2), Intravenous, Once, 6 of 6 cycles  Dose modification: 100 mg/m2 (original dose 75 mg/m2, Cycle 1, Reason: Other (Must fill in a comment))  Administration: 164 mg (5/17/2021), 160 mg (6/8/2021), 160 mg (6/28/2021), 165 mg (7/19/2021), 165 mg (8/9/2021), 169 mg (8/30/2021)  gemcitabine (GEMZAR) infusion, 900 mg/m2 = 1,493.9 mg (112.5 % of original dose 800 mg/m2), Intravenous, Once, 6 of 6 cycles  Dose modification: 900 mg/m2 (original dose 800 mg/m2, Cycle 1, Reason: Other (Must fill in a comment))  Administration: 1,493.9 mg (5/10/2021), 1,476 mg (5/17/2021), 1,400 mg (6/2/2021), 1,476 mg (6/8/2021), 1,400 mg (6/21/2021), 1,400 mg  "(6/28/2021), 1,400 mg (7/12/2021), 1,485.2 mg (7/19/2021), 1,485.2 mg (8/3/2021), 1,485.2 mg (8/9/2021), 1,600 mg (8/23/2021), 1,520.9 mg (8/30/2021)           Patient ID: Suzanne W Kocher is a 69 y.o. female  Patient is a very pleasant 69-year-old female with history of stage Ib endometrial stromal sarcoma with a large 5 cm lesion.  Due to that the patient was treated with Taxotere and gemcitabine for adjuvant therapy.  Initial treatment was in 2021.    Patient presents today in follow-up.  She is without complaint.  Recent CAT scan reveals no evidence of disease.    Today, the patient is doing well.  She denies significant abdominal pain, pelvic pain, nausea, vomiting, constipation, diarrhea, fevers, chills, or vaginal bleeding.             The following portions of the patient's history were reviewed and updated as appropriate: allergies, current medications, past family history, past medical history, past social history, past surgical history, and problem list.    Review of Systems   Constitutional: Negative.    HENT: Negative.     Eyes: Negative.    Respiratory: Negative.     Cardiovascular: Negative.    Gastrointestinal: Negative.    Endocrine: Negative.    Genitourinary: Negative.    Musculoskeletal: Negative.    Skin: Negative.    Neurological: Negative.    Hematological: Negative.    Psychiatric/Behavioral: Negative.         No current outpatient medications on file.     No current facility-administered medications for this visit.           Objective:    Blood pressure 134/84, pulse 83, temperature 98.2 °F (36.8 °C), temperature source Temporal, resp. rate 16, height 5' 3\" (1.6 m), weight 69.3 kg (152 lb 12.8 oz), SpO2 100%, not currently breastfeeding.  Body mass index is 27.07 kg/m².  Body surface area is 1.72 meters squared.    Physical Exam  Constitutional:       Appearance: She is well-developed.   HENT:      Head: Normocephalic and atraumatic.   Neck:      Thyroid: No thyromegaly.   Cardiovascular:    "   Rate and Rhythm: Normal rate and regular rhythm.      Heart sounds: Normal heart sounds.   Pulmonary:      Effort: Pulmonary effort is normal.      Breath sounds: Normal breath sounds.   Abdominal:      General: Bowel sounds are normal.      Palpations: Abdomen is soft.      Comments: Well healed laparoscopic incisions.   Genitourinary:     Comments: -Normal external female genitalia, normal Bartholin's and [place]'s glands                  -Normal midline urethral meatus. No lesions notes                  -Bladder without fullness mass or tenderness                  -Vagina without lesion or discharge No significant cystocele or rectocele noted                  -Cervix surgically absent                  -Uterus surgically absent                  -Adnexae surgically absent                  - Anus without fissure of lesion    Musculoskeletal:         General: Normal range of motion.      Cervical back: Normal range of motion and neck supple.   Lymphadenopathy:      Cervical: No cervical adenopathy.   Skin:     General: Skin is warm and dry.   Neurological:      Mental Status: She is alert and oriented to person, place, and time.   Psychiatric:         Behavior: Behavior normal.         Lab Results   Component Value Date     6.6 10/18/2023     Lab Results   Component Value Date     11/01/2015    K 5.2 10/13/2023     10/13/2023    CO2 21 10/13/2023    ANIONGAP 5 11/01/2015    BUN 15 10/09/2024    CREATININE 0.62 10/09/2024    GLUCOSE 98 11/01/2015    GLUF 106 (H) 10/13/2023    CALCIUM 9.4 10/13/2023    CORRECTEDCA 9.2 09/16/2021    AST 25 10/13/2023    ALT 19 10/13/2023    ALKPHOS 66 10/13/2023    PROT 7.5 11/01/2015    BILITOT 1.15 (H) 11/01/2015    EGFR 91 10/09/2024     Lab Results   Component Value Date    WBC 7.88 10/18/2023    HGB 15.5 (H) 10/18/2023    HCT 46.1 10/18/2023    MCV 92 10/18/2023     10/18/2023     Lab Results   Component Value Date    NEUTROABS 5.08 10/18/2023         Trend:  Lab Results   Component Value Date     6.6 10/18/2023     3.9 03/08/2022     3.6 12/14/2021     5.7 10/25/2021     26.8 09/09/2021     12.0 08/19/2021     6.8 08/02/2021     5.4 07/08/2021     6.9 06/17/2021     9.4 05/27/2021     12.6 05/05/2021

## 2025-01-08 ENCOUNTER — DOCUMENTATION (OUTPATIENT)
Dept: GENETICS | Facility: CLINIC | Age: 70
End: 2025-01-08

## 2025-04-14 ENCOUNTER — APPOINTMENT (OUTPATIENT)
Dept: LAB | Facility: CLINIC | Age: 70
End: 2025-04-14
Payer: COMMERCIAL

## 2025-04-14 ENCOUNTER — TRANSCRIBE ORDERS (OUTPATIENT)
Dept: LAB | Facility: CLINIC | Age: 70
End: 2025-04-14

## 2025-04-14 DIAGNOSIS — C54.1 ENDOMETRIAL STROMAL SARCOMA (HCC): ICD-10-CM

## 2025-04-14 DIAGNOSIS — R59.1 LYMPHADENOPATHY: ICD-10-CM

## 2025-04-14 DIAGNOSIS — I10 ESSENTIAL HYPERTENSION, MALIGNANT: ICD-10-CM

## 2025-04-14 DIAGNOSIS — C54.1 ENDOMETRIAL SARCOMA (HCC): ICD-10-CM

## 2025-04-14 DIAGNOSIS — R73.09 IMPAIRED GLUCOSE TOLERANCE TEST: ICD-10-CM

## 2025-04-14 DIAGNOSIS — C50.911 MALIGNANT NEOPLASM OF RIGHT FEMALE BREAST, UNSPECIFIED ESTROGEN RECEPTOR STATUS, UNSPECIFIED SITE OF BREAST (HCC): ICD-10-CM

## 2025-04-14 DIAGNOSIS — C50.911 MALIGNANT NEOPLASM OF RIGHT FEMALE BREAST, UNSPECIFIED ESTROGEN RECEPTOR STATUS, UNSPECIFIED SITE OF BREAST (HCC): Primary | ICD-10-CM

## 2025-04-14 LAB
ALBUMIN SERPL BCG-MCNC: 4.5 G/DL (ref 3.5–5)
ALP SERPL-CCNC: 65 U/L (ref 34–104)
ALT SERPL W P-5'-P-CCNC: 16 U/L (ref 7–52)
ANION GAP SERPL CALCULATED.3IONS-SCNC: 9 MMOL/L (ref 4–13)
AST SERPL W P-5'-P-CCNC: 16 U/L (ref 13–39)
BASOPHILS # BLD AUTO: 0.07 THOUSANDS/ÂΜL (ref 0–0.1)
BASOPHILS NFR BLD AUTO: 1 % (ref 0–1)
BILIRUB SERPL-MCNC: 1.14 MG/DL (ref 0.2–1)
BUN SERPL-MCNC: 15 MG/DL (ref 5–25)
CALCIUM SERPL-MCNC: 9.3 MG/DL (ref 8.4–10.2)
CHLORIDE SERPL-SCNC: 101 MMOL/L (ref 96–108)
CHOLEST SERPL-MCNC: 173 MG/DL (ref ?–200)
CO2 SERPL-SCNC: 26 MMOL/L (ref 21–32)
CREAT SERPL-MCNC: 0.69 MG/DL (ref 0.6–1.3)
EOSINOPHIL # BLD AUTO: 0.28 THOUSAND/ÂΜL (ref 0–0.61)
EOSINOPHIL NFR BLD AUTO: 4 % (ref 0–6)
ERYTHROCYTE [DISTWIDTH] IN BLOOD BY AUTOMATED COUNT: 12.6 % (ref 11.6–15.1)
GFR SERPL CREATININE-BSD FRML MDRD: 88 ML/MIN/1.73SQ M
GLUCOSE P FAST SERPL-MCNC: 105 MG/DL (ref 65–99)
HCT VFR BLD AUTO: 46.1 % (ref 34.8–46.1)
HDLC SERPL-MCNC: 82 MG/DL
HGB BLD-MCNC: 15.2 G/DL (ref 11.5–15.4)
IMM GRANULOCYTES # BLD AUTO: 0.01 THOUSAND/UL (ref 0–0.2)
IMM GRANULOCYTES NFR BLD AUTO: 0 % (ref 0–2)
LDLC SERPL CALC-MCNC: 75 MG/DL (ref 0–100)
LYMPHOCYTES # BLD AUTO: 1.94 THOUSANDS/ÂΜL (ref 0.6–4.47)
LYMPHOCYTES NFR BLD AUTO: 29 % (ref 14–44)
MCH RBC QN AUTO: 30.2 PG (ref 26.8–34.3)
MCHC RBC AUTO-ENTMCNC: 33 G/DL (ref 31.4–37.4)
MCV RBC AUTO: 92 FL (ref 82–98)
MONOCYTES # BLD AUTO: 0.74 THOUSAND/ÂΜL (ref 0.17–1.22)
MONOCYTES NFR BLD AUTO: 11 % (ref 4–12)
NEUTROPHILS # BLD AUTO: 3.68 THOUSANDS/ÂΜL (ref 1.85–7.62)
NEUTS SEG NFR BLD AUTO: 55 % (ref 43–75)
NONHDLC SERPL-MCNC: 91 MG/DL
NRBC BLD AUTO-RTO: 0 /100 WBCS
PLATELET # BLD AUTO: 211 THOUSANDS/UL (ref 149–390)
PMV BLD AUTO: 11.1 FL (ref 8.9–12.7)
POTASSIUM SERPL-SCNC: 4.5 MMOL/L (ref 3.5–5.3)
PROT SERPL-MCNC: 6.8 G/DL (ref 6.4–8.4)
RBC # BLD AUTO: 5.03 MILLION/UL (ref 3.81–5.12)
SODIUM SERPL-SCNC: 136 MMOL/L (ref 135–147)
TRIGL SERPL-MCNC: 79 MG/DL (ref ?–150)
WBC # BLD AUTO: 6.72 THOUSAND/UL (ref 4.31–10.16)

## 2025-04-14 PROCEDURE — 80061 LIPID PANEL: CPT

## 2025-04-14 PROCEDURE — 36415 COLL VENOUS BLD VENIPUNCTURE: CPT

## 2025-04-14 PROCEDURE — 85025 COMPLETE CBC W/AUTO DIFF WBC: CPT

## 2025-04-14 PROCEDURE — 80053 COMPREHEN METABOLIC PANEL: CPT

## 2025-04-21 ENCOUNTER — HOSPITAL ENCOUNTER (OUTPATIENT)
Dept: CT IMAGING | Facility: HOSPITAL | Age: 70
Discharge: HOME | End: 2025-04-21
Attending: OBSTETRICS & GYNECOLOGY
Payer: COMMERCIAL

## 2025-04-21 DIAGNOSIS — C54.1 ENDOMETRIAL STROMAL SARCOMA (HCC): ICD-10-CM

## 2025-04-21 PROCEDURE — 74177 CT ABD & PELVIS W/CONTRAST: CPT

## 2025-04-21 PROCEDURE — 71260 CT THORAX DX C+: CPT

## 2025-04-21 RX ADMIN — IOHEXOL 100 ML: 350 INJECTION, SOLUTION INTRAVENOUS at 12:40

## 2025-04-28 ENCOUNTER — OFFICE VISIT (OUTPATIENT)
Dept: GYNECOLOGIC ONCOLOGY | Facility: CLINIC | Age: 70
End: 2025-04-28
Payer: COMMERCIAL

## 2025-04-28 VITALS
BODY MASS INDEX: 26.93 KG/M2 | WEIGHT: 152 LBS | RESPIRATION RATE: 18 BRPM | OXYGEN SATURATION: 98 % | DIASTOLIC BLOOD PRESSURE: 70 MMHG | HEART RATE: 76 BPM | TEMPERATURE: 98 F | SYSTOLIC BLOOD PRESSURE: 118 MMHG | HEIGHT: 63 IN

## 2025-04-28 DIAGNOSIS — C54.1 ENDOMETRIAL STROMAL SARCOMA (HCC): Primary | ICD-10-CM

## 2025-04-28 PROCEDURE — 99214 OFFICE O/P EST MOD 30 MIN: CPT | Performed by: OBSTETRICS & GYNECOLOGY

## 2025-04-28 NOTE — PROGRESS NOTES
Name: Suzanne W Kocher      : 1955      MRN: [id number]  Encounter Provider: Bobby Ibanez MD  Encounter Date: 2025   Encounter department: [facility] [place]  :  Assessment & Plan  Endometrial stromal sarcoma (HCC)  Patient has a history of high-grade endometrial stromal sarcoma stage Ib treated with surgery and chemotherapy in .  She has no evidence of recurrence of disease today.  CAT scan and exam are unremarkable.  Will see the patient back in 6 months for repeat evaluation.    The patient is up-to-date for colonoscopy.  She had a large polyp last year and is due for repeat colonoscopy this year.  It is scheduled for .  She does not undergo mammography due to bilateral mastectomies.  Orders:    CT chest abdomen pelvis w contrast; Future    BUN; Future    Creatinine, serum; Future            History of Present Illness   Reason for Visit / CC: Surveillance endometrial stromal sarcoma   Suzanne W Kocher is a 69 y.o. female   Patient is a very pleasant 69-year-old female with history of stage Ib endometrial stromal sarcoma high-grade status post robot assisted total laparoscopic hysterectomy bilateral salpingo-oophorectomy sentinel lymph node mapping and biopsy.  She subsequently underwent 6 cycles of Taxotere Gemzar chemotherapy.  Original diagnosis was in 2021.  Since that time she has remained in clinical remission.    The patient has recently undergone a CT scan of the chest abdomen pelvis which reveals the following:  IMPRESSION:        No evidence for disease progression.     Pulmonary nodules are stable.     Fecalized material in multiple nondilated small bowel loops with large colonic stool burden, findings suggest  slow enteric transit and constipation.     Similar appearance and course of the sigmoid colon compared to prior exam.    Today, the patient is doing well.  She denies significant abdominal pain, pelvic pain, nausea, vomiting,  constipation, diarrhea, fevers, chills, or vaginal bleeding.         Pertinent Medical History          Oncology History   Cancer Staging   Endometrial stromal sarcoma (HCC)  Staging form: Corpus Uteri - Sarcoma, AJCC 8th Edition  - Clinical stage from 4/16/2021: FIGO Stage IB (cT1b, cN0, cM0) - Signed by Bobby Ibanez MD on 4/16/2021  Stage prefix: Initial diagnosis  Histologic grade (G): G3  Histologic grading system: 3 grade system  - Pathologic: FIGO Stage IB (pT1b, pN0, cM0) - Signed by Bobby Ibanez MD on 4/16/2021  Histologic grade (G): G3  Histologic grading system: 3 grade system  Residual tumor (R): R0 - None  Oncology History   Personal history of adenocarcinoma of breast   10/3/2014 Biopsy    Right breast biopsy:  - Ductal carcinoma in situ  Grade 1  %  %     11/7/2014 Surgery    Right mastectomy with sentinel lymph node biopsy:  - Invasive ductal carcinoma with tubular features  2 foci 9mm and 1.7mm  Grade 1  %  AR 95%  HER2 FISH negative    - Clear margins  - 0/8 lymph nodes  at least Stage IA - pT1b, pN0, G1.     Left mastectomy  - Intraductal hyperplasia with atypia.     Bilateral breast reconstruction with expanders  Dr. Cassandra Dr Jan 12/2014 - 1/2020 Hormone Therapy    Anastrozole  Dr. Cota     3/13/2015 Surgery    Bilateral implants placed. Dr Brand     3/11/2016 Surgery    Bilateral fat grafting     10/2016 Genetic Testing    Ambry  BRCA1, BRCA2   negative     3/17/2017 Surgery    Bilateral fat grafting     Endometrial stromal sarcoma (HCC)   10/3/2014 Biopsy    Right breast biopsy:  - Ductal carcinoma in situ  Grade 1  %  %     3/26/2021 Initial Diagnosis    Endometrial stromal sarcoma (HCC)     3/26/2021 Surgery    Robot Hyst BSO with >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. Stage 1B. Literature review to consider Chem RT BMJ 2019     4/16/2021 -  Cancer Staged    Staging form: Corpus Uteri - Sarcoma, AJCC 8th  Edition  - Clinical stage from 4/16/2021: FIGO Stage IB (cT1b, cN0, cM0) - Signed by Bobby Ibanez MD on 4/16/2021  Stage prefix: Initial diagnosis  Histologic grade (G): G3  Histologic grading system: 3 grade system       4/16/2021 -  Cancer Staged    Staging form: Corpus Uteri - Sarcoma, AJCC 8th Edition  - Pathologic: FIGO Stage IB (pT1b, pN0, cM0) - Signed by Bobby Ibanez MD on 4/16/2021  Histologic grade (G): G3  Histologic grading system: 3 grade system  Residual tumor (R): R0 - None       5/10/2021 - 8/31/2021 Chemotherapy    pegfilgrastim (NEULASTA), 4 mg (100 % of original dose 4 mg), Subcutaneous, Once, 2 of 2 cycles  Dose modification: 4 mg (original dose 4 mg, Cycle 5), 4 mg (original dose 4 mg, Cycle 6)  Administration: 4 mg (8/10/2021), 4 mg (8/31/2021)  pegfilgrastim (NEULASTA ONPRO), 6 mg, Subcutaneous, Once, 4 of 4 cycles  Administration: 6 mg (5/17/2021), 6 mg (6/8/2021), 6 mg (6/28/2021), 6 mg (7/19/2021)  DOCEtaxel (TAXOTERE) chemo infusion, 100 mg/m2 = 164 mg (133.3 % of original dose 75 mg/m2), Intravenous, Once, 6 of 6 cycles  Dose modification: 100 mg/m2 (original dose 75 mg/m2, Cycle 1, Reason: Other (Must fill in a comment))  Administration: 164 mg (5/17/2021), 160 mg (6/8/2021), 160 mg (6/28/2021), 165 mg (7/19/2021), 165 mg (8/9/2021), 169 mg (8/30/2021)  gemcitabine (GEMZAR) infusion, 900 mg/m2 = 1,493.9 mg (112.5 % of original dose 800 mg/m2), Intravenous, Once, 6 of 6 cycles  Dose modification: 900 mg/m2 (original dose 800 mg/m2, Cycle 1, Reason: Other (Must fill in a comment))  Administration: 1,493.9 mg (5/10/2021), 1,476 mg (5/17/2021), 1,400 mg (6/2/2021), 1,476 mg (6/8/2021), 1,400 mg (6/21/2021), 1,400 mg (6/28/2021), 1,400 mg (7/12/2021), 1,485.2 mg (7/19/2021), 1,485.2 mg (8/3/2021), 1,485.2 mg (8/9/2021), 1,600 mg (8/23/2021), 1,520.9 mg (8/30/2021)        Review of Systems   Constitutional: Negative.    HENT: Negative.     Eyes: Negative.    Respiratory: Negative.    "  Cardiovascular: Negative.    Gastrointestinal: Negative.    Endocrine: Negative.    Genitourinary: Negative.    Musculoskeletal: Negative.    Skin: Negative.    Neurological: Negative.    Hematological: Negative.    Psychiatric/Behavioral: Negative.      A complete review of systems is negative other than that noted above in the HPI.       Objective   /70 (BP Location: Left arm, Patient Position: Sitting, Cuff Size: Adult)   Pulse 76   Temp 98 °F (36.7 °C)   Resp 18   Ht 5' 3\" (1.6 m)   Wt 68.9 kg (152 lb)   SpO2 98%   BMI 26.93 kg/m²     Body mass index is 26.93 kg/m².  Pain Screening:  Pain Score: 0-No pain  ECOG   0  Physical Exam  Constitutional:       Appearance: She is well-developed.   HENT:      Head: Normocephalic and atraumatic.   Neck:      Thyroid: No thyromegaly.   Cardiovascular:      Rate and Rhythm: Normal rate and regular rhythm.      Heart sounds: Normal heart sounds.   Pulmonary:      Effort: Pulmonary effort is normal.      Breath sounds: Normal breath sounds.   Abdominal:      General: Bowel sounds are normal.      Palpations: Abdomen is soft.      Comments: Well healed laparoscopic incisions.   Genitourinary:     Comments: -Normal external female genitalia, normal Bartholin's and [place]'s glands                  -Normal midline urethral meatus. No lesions notes                  -Bladder without fullness mass or tenderness                  -Vagina without lesion or discharge No significant cystocele or rectocele noted                  -Cervix surgically absent                  -Uterus surgically absent                  -Adnexae surgically absent                  - Anus without fissure of lesion    Musculoskeletal:         General: Normal range of motion.      Cervical back: Normal range of motion and neck supple.   Lymphadenopathy:      Cervical: No cervical adenopathy.   Skin:     General: Skin is warm and dry.   Neurological:      Mental Status: She is alert and oriented to " "person, place, and time.   Psychiatric:         Behavior: Behavior normal.          Labs: I have reviewed pertinent labs.   No results found for: \"\"  Lab Results   Component Value Date/Time    Potassium 4.5 04/14/2025 08:10 AM    Chloride 101 04/14/2025 08:10 AM    CO2 26 04/14/2025 08:10 AM    BUN 15 04/14/2025 08:10 AM    Creatinine 0.69 04/14/2025 08:10 AM    Glucose, Fasting 105 (H) 04/14/2025 08:10 AM    Calcium 9.3 04/14/2025 08:10 AM    AST 16 04/14/2025 08:10 AM    ALT 16 04/14/2025 08:10 AM    Alkaline Phosphatase 65 04/14/2025 08:10 AM    eGFR 88 04/14/2025 08:10 AM     Lab Results   Component Value Date/Time    WBC 6.72 04/14/2025 08:10 AM    Hemoglobin 15.2 04/14/2025 08:10 AM    Hematocrit 46.1 04/14/2025 08:10 AM    MCV 92 04/14/2025 08:10 AM    Platelets 211 04/14/2025 08:10 AM     Lab Results   Component Value Date/Time    Absolute Neutrophils 3.68 04/14/2025 08:10 AM        Trend:  Lab Results   Component Value Date     6.6 10/18/2023     3.9 03/08/2022     3.6 12/14/2021     5.7 10/25/2021     26.8 09/09/2021     12.0 08/19/2021     6.8 08/02/2021     5.4 07/08/2021     6.9 06/17/2021     9.4 05/27/2021     12.6 05/05/2021               "

## 2025-05-29 ENCOUNTER — ONCOLOGY SURVIVORSHIP (OUTPATIENT)
Dept: SURGICAL ONCOLOGY | Facility: CLINIC | Age: 70
End: 2025-05-29
Payer: COMMERCIAL

## 2025-05-29 VITALS
HEIGHT: 63 IN | HEART RATE: 74 BPM | SYSTOLIC BLOOD PRESSURE: 120 MMHG | TEMPERATURE: 97.2 F | BODY MASS INDEX: 26.93 KG/M2 | DIASTOLIC BLOOD PRESSURE: 82 MMHG | OXYGEN SATURATION: 96 %

## 2025-05-29 DIAGNOSIS — Z85.3 PERSONAL HISTORY OF ADENOCARCINOMA OF BREAST: ICD-10-CM

## 2025-05-29 DIAGNOSIS — Z85.3 ENCOUNTER FOR FOLLOW-UP SURVEILLANCE OF BREAST CANCER: Primary | ICD-10-CM

## 2025-05-29 DIAGNOSIS — Z08 ENCOUNTER FOR FOLLOW-UP SURVEILLANCE OF BREAST CANCER: Primary | ICD-10-CM

## 2025-05-29 PROCEDURE — 99213 OFFICE O/P EST LOW 20 MIN: CPT | Performed by: NURSE PRACTITIONER

## 2025-05-29 NOTE — PROGRESS NOTES
Name: Suzanne W Kocher      : 1955      MRN: [id number]  Encounter Provider: PARMJIT Moser  Encounter Date: 2025   Encounter department: [facility] [place]  :  Assessment & Plan  Encounter for follow-up surveillance of breast cancer         Personal history of adenocarcinoma of breast         Patient is a 69-year-old female that was diagnosed with a right-sided breast cancer in 2014.  Her pathology revealed invasive ductal carcinoma, ER/ND positive, HER2 negative.  She underwent a right mastectomy and sentinel lymph node biopsy and a prophylactic left mastectomy.  She had reconstruction with Dr. Soriano.  She completed five years of adjuvant anastrozole therapy and is now on observation.  She had undergone BRCA testing which was negative in .  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     Follow up breast cancer survivorship visit performed today.     She offers no new complaints today and there are no worrisome findings on today's clinical exam.  She noticed increased sensitivity to bilateral reconstructed breasts.  She is going to follow-up with her plastic surgeon.    She is going to follow-up with her PCP for updated osteoporosis screening.  She is up-to-date on colorectal cancer screening and is scheduled for a repeat colonoscopy next month.    She is 10 years from her diagnosis but states that no one else performs a breast exam for her.  I will therefore plan to see her back in 1 year for a follow-up survivorship visit or sooner should the need arise.  She was instructed to contact us with any changes or concerns in the interim.  All of her questions were answered today.        Survivorship  Discussed symptoms related to disease recurrence, Yes     Evaluated for late effects related to cancer treatment, Yes     Screening current for cervical cancer, Yes n/a     Screening current for  colon cancer, Yes scheduled for colonoscopy in June     Cancer rehabilitation services addressed, No     Screening current for osteoporosis, No    pt to f/u with pcp        History of Present Illness   Suzanne W Kocher is a 69 y.o. year old female who presents for follow-up visit.  She has not appreciated any changes on self-exam but noticed some increased sensitivity to bilateral reconstructed breasts.  She denies persistent headaches, back pain or bone pain, cough or shortness of breath, abdominal pain.  She had canceled her genetics appointment.     Oncology History   Cancer Staging   Endometrial stromal sarcoma (HCC)  Staging form: Corpus Uteri - Sarcoma, AJCC 8th Edition  - Clinical stage from 4/16/2021: FIGO Stage IB (cT1b, cN0, cM0) - Signed by Bobby Ibanez MD on 4/16/2021  Stage prefix: Initial diagnosis  Histologic grade (G): G3  Histologic grading system: 3 grade system  - Pathologic: FIGO Stage IB (pT1b, pN0, cM0) - Signed by Bobby Ibanez MD on 4/16/2021  Histologic grade (G): G3  Histologic grading system: 3 grade system  Residual tumor (R): R0  Oncology History   Personal history of adenocarcinoma of breast   10/3/2014 Biopsy    Right breast biopsy:  - Ductal carcinoma in situ  Grade 1  %  %     11/7/2014 Surgery    Right mastectomy with sentinel lymph node biopsy:  - Invasive ductal carcinoma with tubular features  2 foci 9mm and 1.7mm  Grade 1  %  IA 95%  HER2 FISH negative    - Clear margins  - 0/8 lymph nodes  at least Stage IA - pT1b, pN0, G1.     Left mastectomy  - Intraductal hyperplasia with atypia.     Bilateral breast reconstruction with expanders  Dr. Cassandra Dr Jan 12/2014 - 1/2020 Hormone Therapy    Anastrozole  Dr. Cota     3/13/2015 Surgery    Bilateral implants placed. Dr Brand     3/11/2016 Surgery    Bilateral fat grafting     10/2016 Genetic Testing    [facility]  BRCA1, BRCA2   negative     3/17/2017 Surgery    Bilateral fat grafting     Endometrial stromal  sarcoma (HCC)   10/3/2014 Biopsy    Right breast biopsy:  - Ductal carcinoma in situ  Grade 1  %  %     3/26/2021 Initial Diagnosis    Endometrial stromal sarcoma (HCC)     3/26/2021 Surgery    Robot Hyst BSO with >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. Stage 1B. Literature review to consider Chem RT BMJ 2019     4/16/2021 -  Cancer Staged    Staging form: Corpus Uteri - Sarcoma, AJCC 8th Edition  - Clinical stage from 4/16/2021: FIGO Stage IB (cT1b, cN0, cM0) - Signed by Bobby Ibanez MD on 4/16/2021  Stage prefix: Initial diagnosis  Histologic grade (G): G3  Histologic grading system: 3 grade system       4/16/2021 -  Cancer Staged    Staging form: Corpus Uteri - Sarcoma, AJCC 8th Edition  - Pathologic: FIGO Stage IB (pT1b, pN0, cM0) - Signed by Bobby Ibanez MD on 4/16/2021  Histologic grade (G): G3  Histologic grading system: 3 grade system  Residual tumor (R): R0 - None       5/10/2021 - 8/31/2021 Chemotherapy    pegfilgrastim (NEULASTA), 4 mg (100 % of original dose 4 mg), Subcutaneous, Once, 2 of 2 cycles  Dose modification: 4 mg (original dose 4 mg, Cycle 5), 4 mg (original dose 4 mg, Cycle 6)  Administration: 4 mg (8/10/2021), 4 mg (8/31/2021)  pegfilgrastim (NEULASTA ONPRO), 6 mg, Subcutaneous, Once, 4 of 4 cycles  Administration: 6 mg (5/17/2021), 6 mg (6/8/2021), 6 mg (6/28/2021), 6 mg (7/19/2021)  DOCEtaxel (TAXOTERE) chemo infusion, 100 mg/m2 = 164 mg (133.3 % of original dose 75 mg/m2), Intravenous, Once, 6 of 6 cycles  Dose modification: 100 mg/m2 (original dose 75 mg/m2, Cycle 1, Reason: Other (Must fill in a comment))  Administration: 164 mg (5/17/2021), 160 mg (6/8/2021), 160 mg (6/28/2021), 165 mg (7/19/2021), 165 mg (8/9/2021), 169 mg (8/30/2021)  gemcitabine (GEMZAR) infusion, 900 mg/m2 = 1,493.9 mg (112.5 % of original dose 800 mg/m2), Intravenous, Once, 6 of 6 cycles  Dose modification: 900 mg/m2 (original dose 800 mg/m2, Cycle 1,  "Reason: Other (Must fill in a comment))  Administration: 1,493.9 mg (5/10/2021), 1,476 mg (5/17/2021), 1,400 mg (6/2/2021), 1,476 mg (6/8/2021), 1,400 mg (6/21/2021), 1,400 mg (6/28/2021), 1,400 mg (7/12/2021), 1,485.2 mg (7/19/2021), 1,485.2 mg (8/3/2021), 1,485.2 mg (8/9/2021), 1,600 mg (8/23/2021), 1,520.9 mg (8/30/2021)        Review of Systems   Constitutional:  Negative for activity change, appetite change, chills, fatigue, fever and unexpected weight change.   Respiratory:  Negative for cough and shortness of breath.    Cardiovascular:  Negative for chest pain.   Gastrointestinal:  Negative for abdominal pain, constipation, diarrhea, nausea and vomiting.   Musculoskeletal:  Negative for arthralgias, back pain, gait problem and myalgias.   Skin:  Negative for color change and rash.   Neurological:  Negative for dizziness and headaches.   Hematological:  Negative for adenopathy.   Psychiatric/Behavioral:  Negative for agitation and confusion.    All other systems reviewed and are negative.   A complete review of systems is negative other than that noted above in the HPI.           Objective   /82 (BP Location: Left arm, Patient Position: Sitting, Cuff Size: Standard)   Pulse 74   Temp (!) 97.2 °F (36.2 °C) (Temporal)   Ht 5' 3\" (1.6 m)   SpO2 96%   BMI 26.93 kg/m²     Pain Screening:  Pain Score: 0-No pain  ECOG    Physical Exam  Vitals reviewed.   Constitutional:       General: She is not in acute distress.     Appearance: Normal appearance. She is well-developed. She is not diaphoretic.   HENT:      Head: Normocephalic and atraumatic.     Cardiovascular:      Rate and Rhythm: Normal rate and regular rhythm.      Heart sounds: Normal heart sounds.   Pulmonary:      Effort: Pulmonary effort is normal.      Breath sounds: Normal breath sounds.   Chest:      Comments: Bilateral reconstructed breasts with implants present.  There are no dominant masses, skin changes or nodules.  Abdominal:      " Palpations: Abdomen is soft. There is no mass.      Tenderness: There is no abdominal tenderness.     Musculoskeletal:         General: Normal range of motion.      Cervical back: Normal range of motion.   Lymphadenopathy:      Upper Body:      Right upper body: No supraclavicular or axillary adenopathy.      Left upper body: No supraclavicular or axillary adenopathy.     Skin:     General: Skin is warm and dry.      Findings: No rash.     Neurological:      Mental Status: She is alert and oriented to person, place, and time.     Psychiatric:         Speech: Speech normal.